# Patient Record
Sex: FEMALE | Race: WHITE | Employment: FULL TIME | URBAN - METROPOLITAN AREA
[De-identification: names, ages, dates, MRNs, and addresses within clinical notes are randomized per-mention and may not be internally consistent; named-entity substitution may affect disease eponyms.]

---

## 2017-01-23 ENCOUNTER — TELEPHONE (OUTPATIENT)
Dept: PEDIATRICS | Facility: CLINIC | Age: 47
End: 2017-01-23

## 2017-02-20 DIAGNOSIS — H81.01: ICD-10-CM

## 2017-02-20 NOTE — TELEPHONE ENCOUNTER
triamterene-hydrochlorothiazide (MAXZIDE-25) 37.5-25 MG per tablet  Last Written Prescription Date: 3/15/16  Last Fill Quantity: 90, # refills: 3  Last Office Visit with G, P or [facility] prescribing provider: 6/9/16       Potassium   Date Value Ref Range Status   05/19/2016 3.6 3.4 - 5.3 mmol/L Final     Creatinine   Date Value Ref Range Status   05/19/2016 1.05 (H) 0.52 - 1.04 mg/dL Final     Creatinine For Methaneph 24 H/Random Urine   Date Value Ref Range Status   06/27/2012 57  Final     Comment:     Unit: mg/dL     BP Readings from Last 3 Encounters:   06/09/16 112/66   05/19/16 130/82   11/03/15 122/70

## 2017-02-22 RX ORDER — TRIAMTERENE/HYDROCHLOROTHIAZID 37.5-25 MG
1 TABLET ORAL DAILY
Qty: 90 TABLET | Refills: 0 | Status: SHIPPED | OUTPATIENT
Start: 2017-02-22 | End: 2017-05-31

## 2017-02-22 NOTE — TELEPHONE ENCOUNTER
Routing refill request to provider for review/approval because:  Labs out of range:  Cr elevated and due for labs.    Do you want lab only appt or to see you also?

## 2017-04-19 DIAGNOSIS — F51.01 PRIMARY INSOMNIA: ICD-10-CM

## 2017-04-19 RX ORDER — ZOLPIDEM TARTRATE 10 MG/1
TABLET ORAL
Qty: 30 TABLET | Refills: 5 | Status: SHIPPED | OUTPATIENT
Start: 2017-04-19 | End: 2017-10-03

## 2017-04-19 NOTE — TELEPHONE ENCOUNTER
Due for visit for physical in may.  Script printed and placed in my outbasket.  Trisha Guerra MD

## 2017-04-19 NOTE — TELEPHONE ENCOUNTER
Routing refill request to provider for review/approval because:  Drug not on the [facility] refill protocol   Velvet Galvan, RN  Triage Nurse

## 2017-04-19 NOTE — TELEPHONE ENCOUNTER
ZOLPIDEM 10MG      Last Written Prescription Date:  10/21/2016  Last Fill Quantity: 30,   # refills: 5  [place] [facility] [place] [facility] or  Health prescribing provider: 6/9/2016  Future Office visit:       Routing refill request to provider for review/approval because:  Drug not on the [facility] or  Health refill protocol or controlled substance

## 2017-04-26 ENCOUNTER — E-VISIT (OUTPATIENT)
Dept: PEDIATRICS | Facility: CLINIC | Age: 47
End: 2017-04-26

## 2017-04-26 DIAGNOSIS — R21 RASH: Primary | ICD-10-CM

## 2017-04-27 NOTE — PROGRESS NOTES
"  SUBJECTIVE:                                                    Lorena Sears is a 47 year old female who presents to clinic today for the following health issues:      White spots      Duration: while traveling this winter    Description  Location: arms and throat, white spots, possible fungal infection? Ongoing cold sx on and off  Itching: no    Intensity:  mild    Accompanying signs and symptoms: None    History (similar episodes/previous evaluation): None    Precipitating or alleviating factors:  New exposures:  none  Recent travel: YES- [place]     Therapies tried and outcome: none     Was in the [facility] ([facility] and [place]  she noticed white spots/peeling on skin.  She states initially this looked like she had poorly applied sunscreen and had areas that were very white. They have since faded and \"now I can barely see them\".  Rash was not itchy.      Also had white patches and red patches on the back of her throat, tongue was sore.  She thinks this was thrush.  She is unsure if she had a fever associated with this.  She states symptoms resolved in 7-10 days, and now she doesn't notice any white patches in her mouth and her throat is not sore.     Since early feburary has felt run down.  Does have intermittent cough.  She is worried about the big picture- is there something that explains why she has gotten sick.        Problem list and histories reviewed & adjusted, as indicated.  Additional history: as documented    Patient Active Problem List   Diagnosis     CARDIOVASCULAR SCREENING; LDL GOAL LESS THAN 160     Moderate recurrent major depression (H)     PVC (premature ventricular contraction)     DDD (degenerative disc disease), lumbar     Benign hypertension     Neck Pain     Menopause     Episodic tension-type headache, not intractable     Active cochlear Meniere's disease, right     Past Surgical History:   Procedure Laterality Date     WRIST SURGERY      cyst removal on right wrist     " "  Social History   Substance Use Topics     Smoking status: Former Smoker     Types: Cigarettes     Quit date: 8/1/2011     Smokeless tobacco: Never Used     Alcohol use Yes      Comment: 1 drink a day     Family History   Problem Relation Age of Onset     Hypertension Mother      Thyroid Disease Mother      Hypertension Father      CANCER Father      non Hodgkin lymphoma./waldenstroms     Breast Cancer Maternal Grandmother      older at diagnosis     Alzheimer Disease Maternal Grandfather      Neurologic Disorder Maternal Grandfather      parkinsons     CANCER Maternal Grandfather      Alzheimer Disease Paternal Grandmother      CANCER Paternal Grandfather      Breast Cancer Maternal Aunt 50           Reviewed and updated as needed this visit by clinical staff  Tobacco  Allergies  Meds  Med Hx       Reviewed and updated as needed this visit by Provider         ROS:  Constitutional, HEENT, cardiovascular, pulmonary, gi and gu systems are negative, except as otherwise noted.    OBJECTIVE:                                                    /86 (BP Location: Right arm, Patient Position: Chair, Cuff Size: Adult Large)  Pulse 85  Temp 98.5  F (36.9  C) (Tympanic)  Ht 5' 6.25\" (1.683 m)  Wt 191 lb 2 oz (86.7 kg)  SpO2 98%  BMI 30.62 kg/m2  Body mass index is 30.62 kg/(m^2).  GENERAL: healthy, alert and no distress  EYES: Eyes grossly normal to inspection, PERRL and conjunctivae and sclerae normal  HENT: ear canals and TM's normal, nose and mouth without ulcers or lesions  NECK: no adenopathy, no asymmetry, masses, or scars and thyroid normal to palpation  RESP: lungs clear to auscultation - no rales, rhonchi or wheezes  CV: regular rate and rhythm, normal S1 S2, no S3 or S4, no murmur, click or rub, no peripheral edema and peripheral pulses strong  SKIN: very faint hypopigmented patches on left arm with some \"dry skin\" type scaling.      Diagnostic Test Results:  none      ASSESSMENT/PLAN:                  "                                   (J06.9,  B97.89) Viral URI  (primary encounter diagnosis)  -history sounds most consistent with strep or viral uri  -considered zika- rash is atypical, no conjunctivitis or joint pain, no documented fever (although could have had one)  -pt is a lesbian, not planning on having children or getting pregnant.     (R21) Rash  -possible this is tenia versicolor but seems to be improving  -asked patient to watch rash, will not treat at this time    (I10) Benign hypertension  -due for fasting labs prior to physical.  Plan: Albumin Random Urine Quantitative,         Comprehensive metabolic panel         (Z13.6) CARDIOVASCULAR SCREENING; LDL GOAL LESS THAN 160  Plan: Lipid Profile with reflex to direct LDL,         Comprehensive metabolic panel       FUTURE APPOINTMENTS:       - Follow-up for annual visit or as needed-scheduled    Trisha Guerra MD  [facility]

## 2017-05-02 ENCOUNTER — OFFICE VISIT (OUTPATIENT)
Dept: PEDIATRICS | Facility: CLINIC | Age: 47
End: 2017-05-02
Payer: COMMERCIAL

## 2017-05-02 VITALS
BODY MASS INDEX: 30.72 KG/M2 | DIASTOLIC BLOOD PRESSURE: 86 MMHG | OXYGEN SATURATION: 98 % | HEART RATE: 85 BPM | WEIGHT: 191.13 LBS | TEMPERATURE: 98.5 F | HEIGHT: 66 IN | SYSTOLIC BLOOD PRESSURE: 128 MMHG

## 2017-05-02 DIAGNOSIS — Z13.6 CARDIOVASCULAR SCREENING; LDL GOAL LESS THAN 160: ICD-10-CM

## 2017-05-02 DIAGNOSIS — J06.9 VIRAL URI: Primary | ICD-10-CM

## 2017-05-02 DIAGNOSIS — I10 BENIGN HYPERTENSION: ICD-10-CM

## 2017-05-02 DIAGNOSIS — R21 RASH: ICD-10-CM

## 2017-05-02 PROCEDURE — 99214 OFFICE O/P EST MOD 30 MIN: CPT | Performed by: INTERNAL MEDICINE

## 2017-05-02 RX ORDER — TRIAMTERENE/HYDROCHLOROTHIAZID 37.5-25 MG
1 TABLET ORAL DAILY
Qty: 90 TABLET | Refills: 0 | Status: CANCELLED | OUTPATIENT
Start: 2017-05-02

## 2017-05-02 RX ORDER — LOSARTAN POTASSIUM 50 MG/1
50 TABLET ORAL DAILY
Qty: 90 TABLET | Refills: 3 | Status: CANCELLED | OUTPATIENT
Start: 2017-05-02

## 2017-05-02 RX ORDER — CYCLOBENZAPRINE HCL 10 MG
10 TABLET ORAL
Qty: 90 TABLET | Refills: 3 | Status: CANCELLED | OUTPATIENT
Start: 2017-05-02

## 2017-05-02 RX ORDER — LORATADINE 10 MG/1
10 TABLET ORAL DAILY
Qty: 90 TABLET | Refills: 3 | Status: CANCELLED | OUTPATIENT
Start: 2017-05-02

## 2017-05-02 RX ORDER — NORETHINDRONE ACETATE AND ETHINYL ESTRADIOL 1; 5 MG/1; UG/1
1 TABLET ORAL DAILY
Qty: 90 TABLET | Refills: 3 | Status: CANCELLED | OUTPATIENT
Start: 2017-05-02

## 2017-05-02 ASSESSMENT — ANXIETY QUESTIONNAIRES
6. BECOMING EASILY ANNOYED OR IRRITABLE: SEVERAL DAYS
3. WORRYING TOO MUCH ABOUT DIFFERENT THINGS: MORE THAN HALF THE DAYS
1. FEELING NERVOUS, ANXIOUS, OR ON EDGE: SEVERAL DAYS
IF YOU CHECKED OFF ANY PROBLEMS ON THIS QUESTIONNAIRE, HOW DIFFICULT HAVE THESE PROBLEMS MADE IT FOR YOU TO DO YOUR WORK, TAKE CARE OF THINGS AT HOME, OR GET ALONG WITH OTHER PEOPLE: SOMEWHAT DIFFICULT
5. BEING SO RESTLESS THAT IT IS HARD TO SIT STILL: NOT AT ALL
GAD7 TOTAL SCORE: 6
2. NOT BEING ABLE TO STOP OR CONTROL WORRYING: SEVERAL DAYS
7. FEELING AFRAID AS IF SOMETHING AWFUL MIGHT HAPPEN: NOT AT ALL

## 2017-05-02 ASSESSMENT — PATIENT HEALTH QUESTIONNAIRE - PHQ9: 5. POOR APPETITE OR OVEREATING: SEVERAL DAYS

## 2017-05-02 NOTE — NURSING NOTE
"Chief Complaint   Patient presents with     Derm Problem     white patches on back of throat and arms       Initial /86 (BP Location: Right arm, Patient Position: Chair, Cuff Size: Adult Large)  Pulse 85  Temp 98.5  F (36.9  C) (Tympanic)  Ht 5' 6.25\" (1.683 m)  Wt 191 lb 2 oz (86.7 kg)  SpO2 98%  BMI 30.62 kg/m2 Estimated body mass index is 30.62 kg/(m^2) as calculated from the following:    Height as of this encounter: 5' 6.25\" (1.683 m).    Weight as of this encounter: 191 lb 2 oz (86.7 kg).  Medication Reconciliation: complete   Keisha Paiz CMA    "

## 2017-05-02 NOTE — MR AVS SNAPSHOT
After Visit Summary   5/2/2017    Lorena Sears    MRN: [id number]           Patient Information     Date Of Birth          1970        Visit Information        Provider Department      5/2/2017 11:20 AM Trisha Guerra MD [facility]        Today's Diagnoses     Benign hypertension    -  1    CARDIOVASCULAR SCREENING; LDL GOAL LESS THAN 160           Follow-ups after your visit        Your next 10 appointments already scheduled     May 24, 2017 10:00 AM CDT   LAB with [facility]   [facility] ([facility]    [address]  [address]  [place] [id number] [phone]           Patient must bring picture ID.  Patient should be prepared to give a urine specimen  Please do not eat 10-12 hours before your appointment if you are coming in fasting for labs on lipids, cholesterol, or glucose (sugar).  Pregnant women should follow their Care Team instructions. Water with medications is okay. Do not drink coffee or other fluids.   If you have concerns about taking  your medications, please ask at office or if scheduling via [employer], send a message by clicking on Secure Messaging, Message Your Care Team.            May 31, 2017 11:00 AM CDT   Office Visit with Trisha Guerra MD   [facility] ([facility]    [address]  [address]  [place] [address] [phone]           Bring a current list of meds and any records pertaining to this visit.  For Physicals, please bring immunization records and any forms needing to be filled out.  Please arrive 10 minutes early to complete paperwork.              Future tests that were ordered for you today     Open Future Orders        Priority Expected Expires Ordered    Lipid Profile with reflex to direct LDL Routine  8/2/2017 5/2/2017    Comprehensive metabolic panel Routine  8/2/2017 5/2/2017    Albumin Random Urine Quantitative Routine  8/2/2017 5/2/2017  "           Who to contact     If you have questions or need follow up information about today's clinic visit or your schedule please contact [facility] directly at [phone].  Normal or non-critical lab and imaging results will be communicated to you by MyChart, letter or phone within 4 business days after the clinic has received the results. If you do not hear from us within 7 days, please contact the clinic through [employer] or phone. If you have a critical or abnormal lab result, we will notify you by phone as soon as possible.  Submit refill requests through [employer] or call your pharmacy and they will forward the refill request to us. Please allow 3 business days for your refill to be completed.          Additional Information About Your Visit        Visible Path Information     [employer] gives you secure access to your electronic health record. If you see a primary care provider, you can also send messages to your care team and make appointments. If you have questions, please call your primary care clinic.  If you do not have a primary care provider, please call [phone] and they will assist you.        Care EveryWhere ID     This is your Care EveryWhere ID. This could be used by other organizations to access your [place] medical records  [id number]        Your Vitals Were     Pulse Temperature Height Pulse Oximetry BMI (Body Mass Index)       85 98.5  F (36.9  C) (Tympanic) 5' 6.25\" (1.683 m) 98% 30.62 kg/m2        Blood Pressure from Last 3 Encounters:   04/27/17 128/86   06/09/16 112/66   05/19/16 130/82    Weight from Last 3 Encounters:   04/27/17 191 lb 2 oz (86.7 kg)   06/09/16 187 lb 8 oz (85 kg)   05/19/16 188 lb 1 oz (85.3 kg)              We Performed the Following     DEPRESSION ACTION PLAN (DAP)        Primary Care Provider Office Phone # Fax #    Trisha Guerra [phone]       [address] [place] [address]        Thank " you!     Thank you for choosing [facility] [place]  for your care. Our goal is always to provide you with excellent care. Hearing back from our patients is one way we can continue to improve our services. Please take a few minutes to complete the written survey that you may receive in the mail after your visit with us. Thank you!             Your Updated Medication List - Protect others around you: Learn how to safely use, store and throw away your medicines at www.disposemymeds.org.          This list is accurate as of: 5/2/17 11:58 AM.  Always use your most recent med list.                   Brand Name Dispense Instructions for use    buPROPion 150 MG 24 hr tablet    WELLBUTRIN XL    90 tablet    Take 1 tablet (150 mg) by mouth every morning       cyclobenzaprine 10 MG tablet    FLEXERIL    90 tablet    Take 1 tablet (10 mg) by mouth nightly as needed for muscle spasms       ibuprofen 200 MG tablet    ADVIL/MOTRIN     Take 200 mg by mouth every 4 hours as needed.       loratadine 10 MG tablet    CLARITIN    90 tablet    Take 1 tablet (10 mg) by mouth daily       losartan 50 MG tablet    COZAAR    90 tablet    Take 1 tablet (50 mg) by mouth daily       norethindrone-ethinyl estradiol 1-5 MG-MCG per tablet    FEMHRT 1/5    90 tablet    Take 1 tablet by mouth daily       triamterene-hydrochlorothiazide 37.5-25 MG per tablet    MAXZIDE-25    90 tablet    Take 1 tablet by mouth daily       zolpidem 10 MG tablet    AMBIEN    30 tablet    Take 1 tablet (10 mg) by mouth at bedtime as needed for sleep.

## 2017-05-03 ASSESSMENT — ANXIETY QUESTIONNAIRES: GAD7 TOTAL SCORE: 6

## 2017-05-03 ASSESSMENT — PATIENT HEALTH QUESTIONNAIRE - PHQ9: SUM OF ALL RESPONSES TO PHQ QUESTIONS 1-9: 10

## 2017-05-24 DIAGNOSIS — I10 BENIGN HYPERTENSION: ICD-10-CM

## 2017-05-24 DIAGNOSIS — Z13.6 CARDIOVASCULAR SCREENING; LDL GOAL LESS THAN 160: ICD-10-CM

## 2017-05-24 PROCEDURE — 36415 COLL VENOUS BLD VENIPUNCTURE: CPT | Performed by: INTERNAL MEDICINE

## 2017-05-24 PROCEDURE — 82043 UR ALBUMIN QUANTITATIVE: CPT | Performed by: INTERNAL MEDICINE

## 2017-05-24 PROCEDURE — 80061 LIPID PANEL: CPT | Performed by: INTERNAL MEDICINE

## 2017-05-24 PROCEDURE — 80053 COMPREHEN METABOLIC PANEL: CPT | Performed by: INTERNAL MEDICINE

## 2017-05-25 LAB
ALBUMIN SERPL-MCNC: 4 G/DL (ref 3.4–5)
ALP SERPL-CCNC: 61 U/L (ref 40–150)
ALT SERPL W P-5'-P-CCNC: 59 U/L (ref 0–50)
ANION GAP SERPL CALCULATED.3IONS-SCNC: 12 MMOL/L (ref 3–14)
AST SERPL W P-5'-P-CCNC: 34 U/L (ref 0–45)
BILIRUB SERPL-MCNC: 0.5 MG/DL (ref 0.2–1.3)
BUN SERPL-MCNC: 18 MG/DL (ref 7–30)
CALCIUM SERPL-MCNC: 9 MG/DL (ref 8.5–10.1)
CHLORIDE SERPL-SCNC: 106 MMOL/L (ref 94–109)
CHOLEST SERPL-MCNC: 196 MG/DL
CO2 SERPL-SCNC: 22 MMOL/L (ref 20–32)
CREAT SERPL-MCNC: 1.14 MG/DL (ref 0.52–1.04)
CREAT UR-MCNC: 126 MG/DL
GFR SERPL CREATININE-BSD FRML MDRD: 51 ML/MIN/1.7M2
GLUCOSE SERPL-MCNC: 89 MG/DL (ref 70–99)
HDLC SERPL-MCNC: 58 MG/DL
LDLC SERPL CALC-MCNC: 117 MG/DL
MICROALBUMIN UR-MCNC: <5 MG/L
MICROALBUMIN/CREAT UR: NORMAL MG/G CR (ref 0–25)
NONHDLC SERPL-MCNC: 138 MG/DL
POTASSIUM SERPL-SCNC: 3.9 MMOL/L (ref 3.4–5.3)
PROT SERPL-MCNC: 7.7 G/DL (ref 6.8–8.8)
SODIUM SERPL-SCNC: 140 MMOL/L (ref 133–144)
TRIGL SERPL-MCNC: 106 MG/DL

## 2017-05-31 ENCOUNTER — OFFICE VISIT (OUTPATIENT)
Dept: PEDIATRICS | Facility: CLINIC | Age: 47
End: 2017-05-31
Payer: COMMERCIAL

## 2017-05-31 VITALS
HEIGHT: 66 IN | HEART RATE: 85 BPM | DIASTOLIC BLOOD PRESSURE: 82 MMHG | SYSTOLIC BLOOD PRESSURE: 118 MMHG | TEMPERATURE: 98.3 F | OXYGEN SATURATION: 98 % | BODY MASS INDEX: 30.7 KG/M2 | WEIGHT: 191 LBS

## 2017-05-31 DIAGNOSIS — N18.30 CKD (CHRONIC KIDNEY DISEASE) STAGE 3, GFR 30-59 ML/MIN (H): ICD-10-CM

## 2017-05-31 DIAGNOSIS — R10.84 ABDOMINAL PAIN, GENERALIZED: ICD-10-CM

## 2017-05-31 DIAGNOSIS — M25.512 ACUTE PAIN OF LEFT SHOULDER: ICD-10-CM

## 2017-05-31 DIAGNOSIS — H81.01: ICD-10-CM

## 2017-05-31 DIAGNOSIS — Z12.31 VISIT FOR SCREENING MAMMOGRAM: ICD-10-CM

## 2017-05-31 DIAGNOSIS — G47.00 INSOMNIA, UNSPECIFIED TYPE: ICD-10-CM

## 2017-05-31 DIAGNOSIS — M51.369 DDD (DEGENERATIVE DISC DISEASE), LUMBAR: ICD-10-CM

## 2017-05-31 DIAGNOSIS — I10 BENIGN HYPERTENSION: ICD-10-CM

## 2017-05-31 DIAGNOSIS — R25.2 CRAMP IN LOWER LEG: ICD-10-CM

## 2017-05-31 DIAGNOSIS — Z79.890 POST-MENOPAUSE ON HRT (HORMONE REPLACEMENT THERAPY): ICD-10-CM

## 2017-05-31 DIAGNOSIS — E66.9 NON MORBID OBESITY, UNSPECIFIED OBESITY TYPE: ICD-10-CM

## 2017-05-31 DIAGNOSIS — Z00.00 ROUTINE GENERAL MEDICAL EXAMINATION AT A HEALTH CARE FACILITY: Primary | ICD-10-CM

## 2017-05-31 PROCEDURE — 99213 OFFICE O/P EST LOW 20 MIN: CPT | Mod: 25 | Performed by: INTERNAL MEDICINE

## 2017-05-31 PROCEDURE — 99396 PREV VISIT EST AGE 40-64: CPT | Performed by: INTERNAL MEDICINE

## 2017-05-31 RX ORDER — CYCLOBENZAPRINE HCL 10 MG
10 TABLET ORAL
Qty: 90 TABLET | Refills: 3 | Status: SHIPPED | OUTPATIENT
Start: 2017-05-31 | End: 2018-07-26

## 2017-05-31 RX ORDER — LOSARTAN POTASSIUM 50 MG/1
50 TABLET ORAL DAILY
Qty: 90 TABLET | Refills: 3 | Status: SHIPPED | OUTPATIENT
Start: 2017-05-31 | End: 2018-06-14

## 2017-05-31 RX ORDER — NORETHINDRONE ACETATE AND ETHINYL ESTRADIOL .5; 2.5 MG/1; UG/1
1 TABLET ORAL DAILY
Qty: 30 TABLET | Refills: 1 | Status: SHIPPED | OUTPATIENT
Start: 2017-05-31 | End: 2018-04-03

## 2017-05-31 RX ORDER — TRIAMTERENE/HYDROCHLOROTHIAZID 37.5-25 MG
1 TABLET ORAL DAILY
Qty: 90 TABLET | Refills: 0 | Status: SHIPPED | OUTPATIENT
Start: 2017-05-31 | End: 2018-04-03

## 2017-05-31 ASSESSMENT — ANXIETY QUESTIONNAIRES
5. BEING SO RESTLESS THAT IT IS HARD TO SIT STILL: SEVERAL DAYS
1. FEELING NERVOUS, ANXIOUS, OR ON EDGE: NOT AT ALL
6. BECOMING EASILY ANNOYED OR IRRITABLE: MORE THAN HALF THE DAYS
3. WORRYING TOO MUCH ABOUT DIFFERENT THINGS: SEVERAL DAYS
7. FEELING AFRAID AS IF SOMETHING AWFUL MIGHT HAPPEN: NOT AT ALL
IF YOU CHECKED OFF ANY PROBLEMS ON THIS QUESTIONNAIRE, HOW DIFFICULT HAVE THESE PROBLEMS MADE IT FOR YOU TO DO YOUR WORK, TAKE CARE OF THINGS AT HOME, OR GET ALONG WITH OTHER PEOPLE: SOMEWHAT DIFFICULT
GAD7 TOTAL SCORE: 6
2. NOT BEING ABLE TO STOP OR CONTROL WORRYING: SEVERAL DAYS

## 2017-05-31 ASSESSMENT — PATIENT HEALTH QUESTIONNAIRE - PHQ9: 5. POOR APPETITE OR OVEREATING: SEVERAL DAYS

## 2017-05-31 NOTE — PROGRESS NOTES
SUBJECTIVE:     CC: Lorena Sears is an 47 year old woman who presents for preventive health visit.     Physical   Annual:     Getting at least 3 servings of Calcium per day::  Yes    Bi-annual eye exam::  Yes    Dental care twice a year::  Yes    Sleep apnea or symptoms of sleep apnea::  Daytime drowsiness    Diet::  Regular (no restrictions)    Frequency of exercise::  2-3 days/week    Duration of exercise::  30-45 minutes    Taking medications regularly::  Yes

## 2017-05-31 NOTE — NURSING NOTE
"Chief Complaint   Patient presents with     Physical       Initial /82 (BP Location: Right arm, Patient Position: Chair, Cuff Size: Adult Large)  Pulse 85  Temp 98.3  F (36.8  C) (Tympanic)  Ht 5' 6.25\" (1.683 m)  Wt 191 lb (86.6 kg)  SpO2 98%  BMI 30.6 kg/m2 Estimated body mass index is 30.6 kg/(m^2) as calculated from the following:    Height as of this encounter: 5' 6.25\" (1.683 m).    Weight as of this encounter: 191 lb (86.6 kg).  Medication Reconciliation: complete   Keisha Paiz CMA    "

## 2017-05-31 NOTE — MR AVS SNAPSHOT
After Visit Summary   5/31/2017    Lorena Sears    MRN: [id number]           Patient Information     Date Of Birth          1970        Visit Information        Provider Department      5/31/2017 11:00 AM Trisah Guerra MD [facility]        Today's Diagnoses     Routine general medical examination at a health care facility    -  1    Benign hypertension        CKD (chronic kidney disease) stage 3, GFR 30-59 ml/min        Non morbid obesity, unspecified obesity type        Acute pain of left shoulder        Post-menopause on HRT (hormone replacement therapy)        Active cochlear Meniere's disease, right        DDD (degenerative disc disease), lumbar        Insomnia, unspecified type        Visit for screening mammogram          Care Instructions      Preventive Health Recommendations  Female Ages 40 to 49    Yearly exam:     See your health care provider every year in order to  1. Review health changes.   2. Discuss preventive care.    3. Review your medicines if your doctor prescribed any.      Get a Pap test every three years (unless you have an abnormal result and your provider advises testing more often).      If you get Pap tests with HPV test, you only need to test every 5 years, unless you have an abnormal result. You do not need a Pap test if your uterus was removed (hysterectomy) and you have not had cancer.      You should be tested each year for STDs (sexually transmitted diseases), if you're at risk.       Ask your doctor if you should have a mammogram.      Have a colonoscopy (test for colon cancer) if someone in your family has had colon cancer or polyps before age 50.       Have a cholesterol test every 5 years.       Have a diabetes test (fasting glucose) after age 45. If you are at risk for diabetes, you should have this test every 3 years.    Shots: Get a flu shot each year. Get a tetanus shot every 10 years.     Nutrition:     Eat at least 5 servings of  fruits and vegetables each day.    Eat whole-grain bread, whole-wheat pasta and brown rice instead of white grains and rice.    Talk to your provider about Calcium and Vitamin D.     Lifestyle    Exercise at least 150 minutes a week (an average of 30 minutes a day, 5 days a week). This will help you control your weight and prevent disease.    Limit alcohol to one drink per day.    No smoking.     Wear sunscreen to prevent skin cancer.    See your dentist every six months for an exam and cleaning.          Follow-ups after your visit        Additional Services     ENDOCRINOLOGY ADULT REFERRAL       Your provider has referred you to: [facility]: [facility] [place] [facility] [place] [phone]   [email or web address]      Please be aware that coverage of these services is subject to the terms and limitations of your health insurance plan.  Call member services at your health plan with any benefit or coverage questions.      Please bring the following to your appointment:    >>   Any x-rays, CTs or MRIs which have been performed.  Contact the facility where they were done to arrange for  prior to your scheduled appointment.    >>   List of current medications   >>   This referral request   >>   Any documents/labs given to you for this referral            [facility] PT, HAND, AND CHIROPRACTIC REFERRAL       **This order will print in the [facility] Scheduling Office**    Physical Therapy, Hand Therapy and Chiropractic Care are available through:    *[facility]  *[facility]  *[facility]    Call one number to schedule at any of the above locations: [phone].    Your provider has referred you to: Physical Therapy at [facility] or [facility]    Indication/Reason for Referral: shoulder pain  Onset of Illness: 5-6 months   Therapy Orders: Evaluate and Treat  Special Programs: None  Special Request: None    Margaret Robles      Additional Comments for the Therapist or  Chiropractor:     Please be aware that coverage of these services is subject to the terms and limitations of your health insurance plan.  Call member services at your health plan with any benefit or coverage questions.      Please bring the following to your appointment:    *Your personal calendar for scheduling future appointments  *Comfortable clothing                  Future tests that were ordered for you today     Open Future Orders        Priority Expected Expires Ordered    Basic metabolic panel Routine  7/31/2017 5/31/2017    *MA Screening Digital Bilateral Routine  5/24/2018 5/31/2017            Who to contact     If you have questions or need follow up information about today's clinic visit or your schedule please contact [facility] [place] directly at [phone].  Normal or non-critical lab and imaging results will be communicated to you by MyChart, letter or phone within 4 business days after the clinic has received the results. If you do not hear from us within 7 days, please contact the clinic through [employer] or phone. If you have a critical or abnormal lab result, we will notify you by phone as soon as possible.  Submit refill requests through [employer] or call your pharmacy and they will forward the refill request to us. Please allow 3 business days for your refill to be completed.          Additional Information About Your Visit        MyChart Information     [employer] gives you secure access to your electronic health record. If you see a primary care provider, you can also send messages to your care team and make appointments. If you have questions, please call your primary care clinic.  If you do not have a primary care provider, please call [phone] and they will assist you.        Care EveryWhere ID     This is your Care EveryWhere ID. This could be used by other organizations to access your [place] medical records  [id number]        Your Vitals Were     Pulse Temperature Height Pulse  "Oximetry BMI (Body Mass Index)       85 98.3  F (36.8  C) (Tympanic) 5' 6.25\" (1.683 m) 98% 30.6 kg/m2        Blood Pressure from Last 3 Encounters:   05/31/17 118/82   04/27/17 128/86   06/09/16 112/66    Weight from Last 3 Encounters:   05/31/17 191 lb (86.6 kg)   04/27/17 191 lb 2 oz (86.7 kg)   06/09/16 187 lb 8 oz (85 kg)              We Performed the Following     ENDOCRINOLOGY ADULT REFERRAL     [facility] PT, HAND, AND CHIROPRACTIC REFERRAL          Today's Medication Changes          These changes are accurate as of: 5/31/17 11:44 AM.  If you have any questions, ask your nurse or doctor.               These medicines have changed or have updated prescriptions.        Dose/Directions    * norethindrone-ethinyl estradiol 1-5 MG-MCG per tablet   Commonly known as:  FEMHRT 1/5   This may have changed:  Another medication with the same name was added. Make sure you understand how and when to take each.   Used for:  Menopause   Changed by:  Trisha Guerra MD        Dose:  1 tablet   Take 1 tablet by mouth daily   Quantity:  90 tablet   Refills:  3       * norethindrone-eth estradiol 0.5-2.5 MG-MCG Tabs   This may have changed:  You were already taking a medication with the same name, and this prescription was added. Make sure you understand how and when to take each.   Used for:  Post-menopause on HRT (hormone replacement therapy)   Changed by:  Trisha Guerra MD        Dose:  1 tablet   Take 1 tablet by mouth daily   Quantity:  30 tablet   Refills:  1       * Notice:  This list has 2 medication(s) that are the same as other medications prescribed for you. Read the directions carefully, and ask your doctor or other care provider to review them with you.         Where to get your medicines      These medications were sent to [facility] - [address] [address]     Phone:  [phone]     cyclobenzaprine " 10 MG tablet    losartan 50 MG tablet    norethindrone-eth estradiol 0.5-2.5 MG-MCG Tabs    triamterene-hydrochlorothiazide 37.5-25 MG per tablet                Primary Care Provider Office Phone # Fax #    Trisha Guerra [phone]       [facility] [address] [place] [address]        Thank you!     Thank you for choosing [facility]  for your care. Our goal is always to provide you with excellent care. Hearing back from our patients is one way we can continue to improve our services. Please take a few minutes to complete the written survey that you may receive in the mail after your visit with us. Thank you!             Your Updated Medication List - Protect others around you: Learn how to safely use, store and throw away your medicines at www.disposemymeds.org.          This list is accurate as of: 5/31/17 11:44 AM.  Always use your most recent med list.                   Brand Name Dispense Instructions for use    buPROPion 150 MG 24 hr tablet    WELLBUTRIN XL    90 tablet    Take 1 tablet (150 mg) by mouth every morning       cyclobenzaprine 10 MG tablet    FLEXERIL    90 tablet    Take 1 tablet (10 mg) by mouth nightly as needed for muscle spasms       ibuprofen 200 MG tablet    ADVIL/MOTRIN     Take 200 mg by mouth every 4 hours as needed.       loratadine 10 MG tablet    CLARITIN    90 tablet    Take 1 tablet (10 mg) by mouth daily       losartan 50 MG tablet    COZAAR    90 tablet    Take 1 tablet (50 mg) by mouth daily       * norethindrone-ethinyl estradiol 1-5 MG-MCG per tablet    FEMHRT 1/5    90 tablet    Take 1 tablet by mouth daily       * norethindrone-eth estradiol 0.5-2.5 MG-MCG Tabs     30 tablet    Take 1 tablet by mouth daily       triamterene-hydrochlorothiazide 37.5-25 MG per tablet    MAXZIDE-25    90 tablet    Take 1 tablet by mouth daily       zolpidem 10 MG tablet    AMBIEN    30 tablet    Take 1 tablet (10 mg) by mouth at  bedtime as needed for sleep.       * Notice:  This list has 2 medication(s) that are the same as other medications prescribed for you. Read the directions carefully, and ask your doctor or other care provider to review them with you.

## 2017-05-31 NOTE — PROGRESS NOTES
"   SUBJECTIVE:     CC: Lorena Sears is an 47 year old woman who presents for preventive health visit.     Physical   Annual:     Getting at least 3 servings of Calcium per day::  Yes    Bi-annual eye exam::  Yes    Dental care twice a year::  Yes    Sleep apnea or symptoms of sleep apnea::  Daytime drowsiness    Diet::  Regular (no restrictions)    Frequency of exercise::  2-3 days/week    Duration of exercise::  30-45 minutes    Taking medications regularly::  Yes    Medication side effects::  Other (leg cramps)    Additional concerns today::  YES (discuss left shoulder pain, leg cramps)    1) Lower legs cramps \"constantly\", she states at least once a day.  Majority are in the evening or night but can be any time.  Is able to walk through them.  Usually no swelling in feet or ankles.    2) renal function:  Taking 800mg of ibuprofen 5x weekly.    3) weight:  Would like to see endo to discuss work up for obesity.    4) has been on estrogen about 4 years.  Wondering about trying to come off medication.    5) will randomly get deep abdominal pain, usually within 5-10 min of starting exercise but now is occurring without exercise.  Wondering if it is back muscle pain.  Lasts about 10 min, will make her lay on floor and then will resolve on its own.  occurrs about every other month.   6) pain in left shoulder with raising it to shoulder level for the past month.     Hypertension Follow-up      Outpatient blood pressures are not being checked.    Low Salt Diet: not monitoring salt     Depression Followup    Status since last visit: Stable     See PHQ-9 for current symptoms.  Other associated symptoms: None    Complicating factors:   Significant life event:  No   Current substance abuse:  None  Anxiety or Panic symptoms:  No    PHQ-9  English PHQ-9   Any Language            Today's PHQ-2 Score:   PHQ-2 ( 1999 [employer]) 5/28/2017   Q1: Little interest or pleasure in doing things 1   Q2: Feeling down, depressed or hopeless 1 "   PHQ-2 Score 2   Q1: Little interest or pleasure in doing things Several days   Q2: Feeling down, depressed or hopeless Several days   PHQ-2 Score 2       Abuse: Current or Past(Physical, Sexual or Emotional)- NO  Do you feel safe in your environment - YES    Social History   Substance Use Topics     Smoking status: Former Smoker     Types: Cigarettes     Quit date: 8/1/2011     Smokeless tobacco: Never Used     Alcohol use Yes      Comment: 1 drink a day     The patient does not drink >3 drinks per day nor >7 drinks per week.    Recent Labs   Lab Test  05/24/17   0941  05/19/16   1015  04/26/11   0923   CHOL  196  186  173   HDL  58  61  48*   LDL  117*  108*  112   TRIG  106  87  62   CHOLHDLRATIO   --    --   3.6   NHDL  138*  125   --        Reviewed orders with patient.  Reviewed health maintenance and updated orders accordingly - Yes    Mammo Decision Support:  Patient under age 50, mutual decision reflected in health maintenance.      Pertinent mammograms are reviewed under the imaging tab.  History of abnormal Pap smear: NO - age 30-65 PAP every 5 years with negative HPV co-testing recommended    Reviewed and updated as needed this visit by clinical staff  Tobacco  Allergies  Meds  Med Hx  Surg Hx  Fam Hx  Soc Hx        Reviewed and updated as needed this visit by Provider        History reviewed. No pertinent past medical history.   Past Surgical History:   Procedure Laterality Date     WRIST SURGERY      cyst removal on right wrist       ROS:  C: NEGATIVE for fever, chills, change in weight  I: NEGATIVE for worrisome rashes, moles or lesions  E: NEGATIVE for vision changes or irritation  ENT: NEGATIVE for ear, mouth and throat problems  R: NEGATIVE for significant cough or SOB  B: NEGATIVE for masses, tenderness or discharge  CV: NEGATIVE for chest pain, palpitations or peripheral edema  GI: NEGATIVE for nausea, abdominal pain, heartburn, or change in bowel habits  : NEGATIVE for unusual urinary  "or vaginal symptoms. No vaginal bleeding.  M: NEGATIVE for significant arthralgias or myalgia  N: NEGATIVE for weakness, dizziness or paresthesias  P: NEGATIVE for changes in mood or affect     Problem list, Medication list, Allergies, and Medical/Social/Surgical histories reviewed in EPIC and updated as appropriate.  OBJECTIVE:     /82 (BP Location: Right arm, Patient Position: Chair, Cuff Size: Adult Large)  Pulse 85  Temp 98.3  F (36.8  C) (Tympanic)  Ht 5' 6.25\" (1.683 m)  Wt 191 lb (86.6 kg)  SpO2 98%  BMI 30.6 kg/m2  EXAM:  GENERAL: healthy, alert and no distress  EYES: Eyes grossly normal to inspection, PERRL and conjunctivae and sclerae normal  HENT: ear canals and TM's normal, nose and mouth without ulcers or lesions  NECK: no adenopathy, no asymmetry, masses, or scars and thyroid normal to palpation  RESP: lungs clear to auscultation - no rales, rhonchi or wheezes  BREAST: normal without masses, tenderness or nipple discharge and no palpable axillary masses or adenopathy  CV: regular rate and rhythm, normal S1 S2, no S3 or S4, no murmur, click or rub, no peripheral edema  ABDOMEN: soft, nontender, no hepatosplenomegaly, no masses and bowel sounds normal  MS: no gross musculoskeletal defects noted, no edema  SKIN: no suspicious lesions or rashes  NEURO: Normal strength and tone, mentation intact and speech normal  PSYCH: mentation appears normal, affect normal/bright    ASSESSMENT/PLAN:     (Z00.00) Routine general medical examination at a health care facility  (primary encounter diagnosis)  -mammo due  -pap utd  -imm utd      (R25.2) Cramp in lower leg  -could be due to hctz  -check electrolytes  -discussed stretching  -if labs normal would consider stopping hctz to see if that improves cramping     (Z79.890) Post-menopause on HRT (hormone replacement therapy)  -will cut dose in 1/2 and see if she has an increase in hot flashes  -if no increase in hot flashes can decrease her dose to QOD and " "then gradually wean off over a few months   Plan: norethindrone-eth estradiol 0.5-2.5 MG-MCG TABS        (E66.9) Non morbid obesity, unspecified obesity type  -pt wishes to discuss possible weight loss medications with endocrinology   Plan: ENDOCRINOLOGY ADULT REFERRAL            (R10.84) Abdominal pain, generalized  -this pain doesn't seem to fit with any particular clear diagnosis for abdominal pain  -association with exercise makes me wonder about core muscle cramping  -also would fit with an increase in her generalized muscle cramping     (I10) Benign hypertension  -bp well controlled on current meds of maxide and losartan  -if she stops maxide would need to increase losartan to 100mg   Plan: losartan (COZAAR) 50 MG tablet           (N18.3) CKD (chronic kidney disease) stage 3, GFR 30-59 ml/min  -ibuprofen use is reasonable, unless renal function is worsening  -check labs   Plan: Basic metabolic panel       (M25.512) Acute pain of left shoulder  -will refer to PT   Plan: [facility] PT, HAND, AND CHIROPRACTIC REFERRAL          (H81.01) Active cochlear Meniere's disease, right  Plan: triamterene-hydrochlorothiazide (MAXZIDE-25)         37.5-25 MG per tablet          (M51.36) DDD (degenerative disc disease), lumbar  Plan: cyclobenzaprine (FLEXERIL) 10 MG tablet         (G47.00) Insomnia, unspecified type  -on chronic ambien  -understands risks of ambien and wishes to stay on medication     (Z12.31) Visit for screening mammogram  Plan: *MA Screening Digital Bilateral         COUNSELING:  Reviewed preventive health counseling, as reflected in patient instructions       Regular exercise       Healthy diet/nutrition         reports that she quit smoking about 5 years ago. Her smoking use included Cigarettes. She has never used smokeless tobacco.    Estimated body mass index is 30.6 kg/(m^2) as calculated from the following:    Height as of this encounter: 5' 6.25\" (1.683 m).    Weight as of this encounter: 191 lb (86.6 kg). "   Weight management plan: Patient referred to endocrine and/or weight management specialty    Counseling Resources:  ATP IV Guidelines  Pooled Cohorts Equation Calculator  Breast Cancer Risk Calculator  FRAX Risk Assessment  ICSI Preventive Guidelines  Dietary Guidelines for Americans, 2010  USDA's MyPlate  ASA Prophylaxis  Lung CA Screening    Trisha Guerra MD  [facility]  Answers for HPI/ROS submitted by the patient on 5/28/2017   PHQ-2 Score: 2

## 2017-06-01 ASSESSMENT — ANXIETY QUESTIONNAIRES: GAD7 TOTAL SCORE: 6

## 2017-06-01 ASSESSMENT — PATIENT HEALTH QUESTIONNAIRE - PHQ9: SUM OF ALL RESPONSES TO PHQ QUESTIONS 1-9: 9

## 2017-06-02 ENCOUNTER — MYC MEDICAL ADVICE (OUTPATIENT)
Dept: PEDIATRICS | Facility: CLINIC | Age: 47
End: 2017-06-02

## 2017-06-02 NOTE — LETTER
[address] [address]  [phone]      2017    To whom it may concern,    Lorena Sears RANJAN 1970 is under my care for chronic headaches and neck and shoulder pain. I have recommended massage therapy to help with this pain, it does decrease her headaches.        Sincerely,         Trisha Guerra MD

## 2017-06-07 ENCOUNTER — AMBULATORY - HEALTHEAST (OUTPATIENT)
Dept: ADMINISTRATIVE | Facility: REHABILITATION | Age: 47
End: 2017-06-07

## 2017-06-07 DIAGNOSIS — M25.512 ACUTE PAIN OF LEFT SHOULDER: ICD-10-CM

## 2017-06-07 PROBLEM — E66.9 NON MORBID OBESITY, UNSPECIFIED OBESITY TYPE: Status: ACTIVE | Noted: 2017-06-07

## 2017-06-07 PROBLEM — E66.9 NON MORBID OBESITY, UNSPECIFIED OBESITY TYPE: Status: ACTIVE | Noted: 2017-05-31

## 2017-06-08 DIAGNOSIS — F33.1 MODERATE RECURRENT MAJOR DEPRESSION (H): ICD-10-CM

## 2017-06-08 NOTE — TELEPHONE ENCOUNTER
buPROPion (WELLBUTRIN XL) 150 MG 24 hr tablet       Last Written Prescription Date: 8/10/2016  Last Fill Quantity: 90; # refills: 3  Last Office Visit with [facility], [facility] or [facility] prescribing provider:  5/31/2017        Last PHQ-9 score on record=   PHQ-9 SCORE 5/31/2017   Total Score MyChart -   Total Score 9       Lab Results   Component Value Date    AST 34 05/24/2017     Lab Results   Component Value Date    ALT 59 05/24/2017

## 2017-06-14 RX ORDER — BUPROPION HYDROCHLORIDE 150 MG/1
TABLET ORAL
Qty: 90 TABLET | Refills: 3 | Status: SHIPPED | OUTPATIENT
Start: 2017-06-14 | End: 2018-06-14

## 2017-06-14 NOTE — TELEPHONE ENCOUNTER
Routing refill request to provider for review/approval because:  Patient was advised to come back for follow up.  PHQ 9 >4, please sign if ok.  Velvet Galvan, RN  Triage Nurse

## 2017-06-27 ENCOUNTER — COMMUNICATION - HEALTHEAST (OUTPATIENT)
Dept: TELEHEALTH | Facility: CLINIC | Age: 47
End: 2017-06-27

## 2017-06-27 ENCOUNTER — OFFICE VISIT - HEALTHEAST (OUTPATIENT)
Dept: PHYSICAL THERAPY | Facility: REHABILITATION | Age: 47
End: 2017-06-27

## 2017-06-27 DIAGNOSIS — R29.3 POOR POSTURE: ICD-10-CM

## 2017-06-27 DIAGNOSIS — M25.512 ACUTE PAIN OF LEFT SHOULDER: ICD-10-CM

## 2017-06-27 DIAGNOSIS — M25.612 DECREASED ROM OF LEFT SHOULDER: ICD-10-CM

## 2017-06-27 DIAGNOSIS — R29.898 SHOULDER WEAKNESS: ICD-10-CM

## 2017-06-27 DIAGNOSIS — M25.812 SHOULDER IMPINGEMENT, LEFT: ICD-10-CM

## 2017-06-29 ENCOUNTER — OFFICE VISIT - HEALTHEAST (OUTPATIENT)
Dept: PHYSICAL THERAPY | Facility: REHABILITATION | Age: 47
End: 2017-06-29

## 2017-06-29 DIAGNOSIS — M25.812 SHOULDER IMPINGEMENT, LEFT: ICD-10-CM

## 2017-06-29 DIAGNOSIS — R29.3 POOR POSTURE: ICD-10-CM

## 2017-06-29 DIAGNOSIS — M25.612 DECREASED ROM OF LEFT SHOULDER: ICD-10-CM

## 2017-06-29 DIAGNOSIS — R29.898 SHOULDER WEAKNESS: ICD-10-CM

## 2017-06-29 DIAGNOSIS — M25.512 ACUTE PAIN OF LEFT SHOULDER: ICD-10-CM

## 2017-07-11 ENCOUNTER — OFFICE VISIT - HEALTHEAST (OUTPATIENT)
Dept: PHYSICAL THERAPY | Facility: REHABILITATION | Age: 47
End: 2017-07-11

## 2017-07-11 DIAGNOSIS — M25.512 ACUTE PAIN OF LEFT SHOULDER: ICD-10-CM

## 2017-07-11 DIAGNOSIS — M25.812 SHOULDER IMPINGEMENT, LEFT: ICD-10-CM

## 2017-07-11 DIAGNOSIS — R29.898 SHOULDER WEAKNESS: ICD-10-CM

## 2017-07-11 DIAGNOSIS — M25.612 DECREASED ROM OF LEFT SHOULDER: ICD-10-CM

## 2017-07-11 DIAGNOSIS — R29.3 POOR POSTURE: ICD-10-CM

## 2017-07-17 ENCOUNTER — OFFICE VISIT (OUTPATIENT)
Dept: ENDOCRINOLOGY | Facility: CLINIC | Age: 47
End: 2017-07-17
Payer: COMMERCIAL

## 2017-07-17 VITALS
HEIGHT: 66 IN | BODY MASS INDEX: 31.19 KG/M2 | SYSTOLIC BLOOD PRESSURE: 122 MMHG | HEART RATE: 71 BPM | WEIGHT: 194.1 LBS | OXYGEN SATURATION: 98 % | TEMPERATURE: 98.1 F | DIASTOLIC BLOOD PRESSURE: 84 MMHG

## 2017-07-17 DIAGNOSIS — E66.9 NON MORBID OBESITY, UNSPECIFIED OBESITY TYPE: Primary | ICD-10-CM

## 2017-07-17 DIAGNOSIS — I10 BENIGN HYPERTENSION: ICD-10-CM

## 2017-07-17 LAB
HBA1C MFR BLD: 4.9 % (ref 4.3–6)
TSH SERPL DL<=0.005 MIU/L-ACNC: 2.66 MU/L (ref 0.4–4)

## 2017-07-17 PROCEDURE — 36415 COLL VENOUS BLD VENIPUNCTURE: CPT | Performed by: INTERNAL MEDICINE

## 2017-07-17 PROCEDURE — 99244 OFF/OP CNSLTJ NEW/EST MOD 40: CPT | Performed by: INTERNAL MEDICINE

## 2017-07-17 PROCEDURE — 84443 ASSAY THYROID STIM HORMONE: CPT | Performed by: INTERNAL MEDICINE

## 2017-07-17 PROCEDURE — 83036 HEMOGLOBIN GLYCOSYLATED A1C: CPT | Performed by: INTERNAL MEDICINE

## 2017-07-17 NOTE — MR AVS SNAPSHOT
After Visit Summary   2017    Lorena Sears    MRN: [id number]           Patient Information     Date Of Birth          1970        Visit Information        Provider Department      2017 10:00 AM Tiana Anderson MD [facility]        Today's Diagnoses     Non morbid obesity, unspecified obesity type    -  1    Benign hypertension          Care Instructions    [facility]   Dr Anderson, Endocrinology Department      [facility]   [address]  [place] [address]  Appointment [phone]  Fax: [phone]  [place]: Monday and Tuesday         [place] [address] [facility] [facility] [address]  [place] [address]  Appointment [phone]  Fax: [phone]  [place]: Wednesday and Thursday          Labs today  Follow up with dietician    24 Hour Urine Collection    - During a 24-hour urine collection, follow your usual diet and drink fluids as you ordinarily would.  - Avoid drinking alcohol before and during the urine collection.    - For a 24-hour urine collection, all of the urine that you pass over a 24-hour time period must be collected. You will receive a special container to collect the sample.    The following are directions for collecting a 24-hour urine sample while at home:    In the morning scheduled to begin the urine collection, urinate in the toilet and flush away the first urine you pass. Write down the date and time. That is the start date and time for the collection.    Collect all urine you pass, day and night, for 24 hours. Use the container given to you to collect the urine. Avoid using other containers. The urine sample must include the last urine that you pass 24 hours after starting the collection. Do not allow toilet paper, stool, or anything else to be added to the urine sample.    Write down the date and time that the last sample is  collected.              Follow-ups after your visit        Additional Services     NUTRITION REFERRAL       Your provider has referred you to: [facility]: [facility] [phone]   [email or web address]    Please be aware that coverage of these services is subject to the terms and limitations of your health insurance plan.  Call member services at your health plan with any benefit or coverage questions.      Please bring the following to your appointment:      >>   This referral request   >>   Any documents given to you for this referral  >>   Any specific questions you have about diet or food choices                  Future tests that were ordered for you today     Open Future Orders        Priority Expected Expires Ordered    Cortisol free urine Routine  7/18/2018 7/17/2017            Who to contact     If you have questions or need follow up information about today's clinic visit or your schedule please contact [facility] directly at [phone].  Normal or non-critical lab and imaging results will be communicated to you by MyChart, letter or phone within 4 business days after the clinic has received the results. If you do not hear from us within 7 days, please contact the clinic through [employer] or phone. If you have a critical or abnormal lab result, we will notify you by phone as soon as possible.  Submit refill requests through [employer] or call your pharmacy and they will forward the refill request to us. Please allow 3 business days for your refill to be completed.          Additional Information About Your Visit        MyChart Information     [employer] gives you secure access to your electronic health record. If you see a primary care provider, you can also send messages to your care team and make appointments. If you have questions, please call your primary care clinic.  If you do not have a primary care provider, please call [phone] and they will  "assist you.        Care EveryWhere ID     This is your Care EveryWhere ID. This could be used by other organizations to access your [place] medical records  [id number]        Your Vitals Were     Pulse Temperature Height Pulse Oximetry BMI (Body Mass Index)       71 98.1  F (36.7  C) (Oral) 1.683 m (5' 6.25\") 98% 31.09 kg/m2        Blood Pressure from Last 3 Encounters:   07/17/17 122/84   05/31/17 118/82   04/27/17 128/86    Weight from Last 3 Encounters:   07/17/17 88 kg (194 lb 1.6 oz)   05/31/17 86.6 kg (191 lb)   04/27/17 86.7 kg (191 lb 2 oz)              We Performed the Following     Hemoglobin A1c     NUTRITION REFERRAL     TSH with free T4 reflex     WEIGHT MANAGEMENT INFO        Primary Care Provider Office Phone # Fax #    Trisha Guerra [phone]       [address] [place] [address]        Equal Access to Services     [facility] : Hadii aad ku hadasho Soomaali, waaxda luqadaha, qaybta kaalmada adeegyada, waxay idiin hayandin kate littlejohn . So [facility] [phone].    ATENCIÓN: Si habla español, tiene a alfred disposición servicios gratuitos de asistencia lingüística. Llame al [phone].    We comply with applicable federal civil rights laws and [place] laws. We do not discriminate on the basis of race, color, national origin, age, disability sex, sexual orientation or gender identity.            Thank you!     Thank you for choosing [facility]  for your care. Our goal is always to provide you with excellent care. Hearing back from our patients is one way we can continue to improve our services. Please take a few minutes to complete the written survey that you may receive in the mail after your visit with us. Thank you!             Your Updated Medication List - Protect others around you: Learn how to safely use, store and throw away your medicines at www.disposemymeds.org.          This list is accurate as of: 7/17/17 " 10:45 AM.  Always use your most recent med list.                   Brand Name Dispense Instructions for use Diagnosis    * buPROPion 150 MG 24 hr tablet    WELLBUTRIN XL    90 tablet    Take 1 tablet (150 mg) by mouth every morning    Moderate recurrent major depression (H)       * buPROPion 150 MG 24 hr tablet    WELLBUTRIN XL    90 tablet    Take 1 tablet (150 mg) by mouth daily every morning.    Moderate recurrent major depression (H)       cyclobenzaprine 10 MG tablet    FLEXERIL    90 tablet    Take 1 tablet (10 mg) by mouth nightly as needed for muscle spasms    DDD (degenerative disc disease), lumbar       ibuprofen 200 MG tablet    ADVIL/MOTRIN     Take 200 mg by mouth every 4 hours as needed.        loratadine 10 MG tablet    CLARITIN    90 tablet    Take 1 tablet (10 mg) by mouth daily    Environmental allergies       losartan 50 MG tablet    COZAAR    90 tablet    Take 1 tablet (50 mg) by mouth daily    Benign hypertension       * norethindrone-eth estradiol 0.5-2.5 MG-MCG Tabs     30 tablet    Take 1 tablet by mouth daily    Post-menopause on HRT (hormone replacement therapy)       * norethindrone-ethinyl estradiol 1-5 MG-MCG per tablet    FEMHRT 1/5    90 tablet    Take 1 tablet by mouth daily    Menopause       triamterene-hydrochlorothiazide 37.5-25 MG per tablet    MAXZIDE-25    90 tablet    Take 1 tablet by mouth daily    Active cochlear Meniere's disease, right       zolpidem 10 MG tablet    AMBIEN    30 tablet    Take 1 tablet (10 mg) by mouth at bedtime as needed for sleep.    Primary insomnia       * Notice:  This list has 4 medication(s) that are the same as other medications prescribed for you. Read the directions carefully, and ask your doctor or other care provider to review them with you.

## 2017-07-17 NOTE — PROGRESS NOTES
Name: Lorena Sears  Seen at the request of Trisha Guerra for obesity.  HPI:  Lorena Sears is a 47 year old female who presents for the evaluation of obestiy.           Initial visit     Previous visit       Current   weight       194 lbs 1.6 oz     BMI   Body mass index is 31.09 kg/(m^2).     Waist circumference        Growing up was overweight.  20-30 lbs more than normal wt.  H/o early menopause.  No children.  Gradual wt gain over last few years  Likes to cook and eat.  When back from home- tired and not eating healthy.      Menses: early menopause at age 42.  Diarrhea/Constipation:Yes: more constipation  Changes in Hair or Skin:No. Maybe more dry skin  Diabetes:No  Sleep: use Ambien. 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  Hypertension:Yes: on medication  Hyperlipidemia:No  Hirsutism:No  Easy Brusing:Yes: thinks yes  Use of Steroids:No  Family history of Obesity:Yes: brother is obese. other family memerbs overwt  Diet: mostly home food. 2-3 meals a day. Snacks at work. More eating as a part of comfort measure.   Exercise:Yes: gym 3- 5 times a week. 30-50 min cross fit.  PMH/PSH:  History reviewed. No pertinent past medical history.  Past Surgical History:   Procedure Laterality Date     WRIST SURGERY      cyst removal on right wrist     Family Hx:  Family History   Problem Relation Age of Onset     Hypertension Mother      Thyroid Disease Mother      Hypertension Father      CANCER Father      non Hodgkin lymphoma./waldenstroms     Breast Cancer Maternal Grandmother      older at diagnosis     Alzheimer Disease Maternal Grandfather      Neurologic Disorder Maternal Grandfather      parkinsons     CANCER Maternal Grandfather      Alzheimer Disease Paternal Grandmother      CANCER Paternal Grandfather      Breast Cancer Maternal Aunt 50            Social Hx:  Social History     Social History     Marital status: Life Partner     Spouse name: N/A      "Number of children: N/A     Years of education: N/A     Occupational History     Not on file.     Social History Main Topics     Smoking status: Former Smoker     Types: Cigarettes     Quit date: 8/1/2011     Smokeless tobacco: Never Used     Alcohol use Yes      Comment: 1 drink a day     Drug use: No     Sexual activity: Yes     Partners: Female     Other Topics Concern     Parent/Sibling W/ Cabg, Mi Or Angioplasty Before 65f 55m? No     Social History Narrative          MEDICATIONS:  has a current medication list which includes the following prescription(s): norethindrone-ethinyl estradiol, bupropion, losartan, cyclobenzaprine, norethindrone-eth estradiol, zolpidem, bupropion, ibuprofen, triamterene-hydrochlorothiazide, and loratadine.    ROS     ROS: 10 point ROS neg other than the symptoms noted above in the HPI.    Physical Exam   VS: /84  Pulse 71  Temp 98.1  F (36.7  C) (Oral)  Ht 1.683 m (5' 6.25\")  Wt 88 kg (194 lb 1.6 oz)  SpO2 98%  BMI 31.09 kg/m2  GENERAL: AXOX3, NAD, well dressed, answering questions appropriately, appears stated age.  HEENT: OP clear, no LAD, no TM, non-tender, no exopthalmous, no proptosis, EOMI, no lig lag, no retraction  NECK: Thyroid normal in size, non tender, no nodules were palpated.  CV: RRR, no rubs, gallops, no murmurs  LUNGS: CTAB, no wheezes, rales, or ronchi  ABDOMEN: soft, nontender, nondistended, +BS, no organomegaly. No hyperpigmented stretch marks  EXTREMITIES: no edema, +pulses, no rashes, no lesions  NEUROLOGY: CN grossly intact, + DTR upper and lower extremity, no tremors  MSK: grossly intact  SKIN: no rashes, no lesions    LABS:  Last Basic Metabolic Panel:  Lab Results   Component Value Date     05/24/2017      Lab Results   Component Value Date    POTASSIUM 3.9 05/24/2017     Lab Results   Component Value Date    CHLORIDE 106 05/24/2017     Lab Results   Component Value Date    TIESHA 9.0 05/24/2017     Lab Results   Component Value Date    CO2 22 " 05/24/2017     Lab Results   Component Value Date    BUN 18 05/24/2017     Lab Results   Component Value Date    CR 1.14 05/24/2017     Lab Results   Component Value Date    GLC 89 05/24/2017       Lab Results   Component Value Date    TSH 3.63 05/19/2016         All pertinent notes, labs, and images personally reviewed by me.     A/P  Ms.Lori DOE Sears is a 47 year old here for the evaluation of obestiy:    1. Obesity-    Body mass index is 31.09 kg/(m^2).  Obesity is associated with a significant increase in mortality and risk of many disorders, including diabetes mellitus, hypertension, dyslipidemia, heart disease, stroke, sleep apnea, cancer, and many others. Conversely, weight loss is associated with a reduction in obesity-associated morbidity.  Endocrine evaluation of obesity includes Cushing's and thyroid dysfunction.  Will obtain TSH and freeT4 along with 24 hour urine collection.  Will also obtain A1c.  Obesity complicated by HTN ( on medications)  No DM, REY.  Off note she is on Bupropion Xl 150 mg.  -- referral to nutritionist  She needs to work on healthy diet and reduce carbs  The patient is advised to Make better food choices: reduce carbs, Reduce portion size, weight loss and exercise 3-4 times a week.  Consider medical wt loss after above.     Plan: Cortisol free urine, TSH with free T4 reflex,         Hemoglobin A1c, NUTRITION REFERRAL         Discussed:      Obesity is a biological preventable and treatable disease, which is associated with significantly increased risk of many acute and chronic health conditions. Obesity has now been recognized as a chronic disease by the American Medical Association.    A range of serious co-morbidities are associated with obesity including increased risk for hypertension, stroke, coronary artery disease, dyslipidemia, Type II diabetes, depression, sleep apnea, cancers of the colon, breast and endometrium, osteoarthritis and female infertility. Therefore, obesity  is not just a problem; it s a disease that warrants serious evidence based treatements.    I explained to the patient relevant work up for the diagnosis and management of obesity and discussed treatment options. Body Mass Index (BMI) has been a standard means for calculating risk for overweight and obesity. The new American Association of Clinical Endocrinology (AACE) algorithm recommends lifestyle modifications as the initial phase of treatment for all patients with the BMI equal or greater than 25 kg/m2. Lifestyle modifications includes use of medical nutrition therapy, exercise, tobacco cessation, adequate quality and quantity of sleep, limited consumption of alcohol and reduced stress through implementation of a structured, multidisciplinary program.    In patients with complications associated with obesity, graded interventions are recommended including pharmacological therapy such as phentermine, orlistat, lorcaserin and phentermine/topiramate ER, contrave ( buproprion/naltreone) and the use of very low calorie meal replacement programs.    If medical intervention is insufficient, surgical therapy may be considered, especially in patients with BMI greater than or equal to 35 kg/m2 with multiple complications. Surgical treatments include lap-band, gastric sleeve or gastric bypass surgery.    More than 50% of the time spent with Ms. Sears on counseling / coordinating her care.        Follow-up:  After above    Tiana Anderson MD  Endocrinology   [facility]    CC: Trisha Guerra    Addendum to above note and clinic visit:    Labs reviewed.    See result note/telephone encounter.

## 2017-07-17 NOTE — PATIENT INSTRUCTIONS
[facility]   Dr Anderson, Endocrinology Department      [facility]   [address]  [place] [address]  Appointment [phone]  Fax: [phone]  [place]: Monday and Tuesday         [facility]   [address]  [place] [address]  Appointment [phone]  Fax: [phone]  [place]: Wednesday and Thursday          Labs today  Follow up with dietician    24 Hour Urine Collection    - During a 24-hour urine collection, follow your usual diet and drink fluids as you ordinarily would.  - Avoid drinking alcohol before and during the urine collection.    - For a 24-hour urine collection, all of the urine that you pass over a 24-hour time period must be collected. You will receive a special container to collect the sample.    The following are directions for collecting a 24-hour urine sample while at home:    In the morning scheduled to begin the urine collection, urinate in the toilet and flush away the first urine you pass. Write down the date and time. That is the start date and time for the collection.    Collect all urine you pass, day and night, for 24 hours. Use the container given to you to collect the urine. Avoid using other containers. The urine sample must include the last urine that you pass 24 hours after starting the collection. Do not allow toilet paper, stool, or anything else to be added to the urine sample.    Write down the date and time that the last sample is collected.

## 2017-07-17 NOTE — NURSING NOTE
"Chief Complaint   Patient presents with     Referral     Dr Guerra for non morbid obesity        Initial /84  Pulse 71  Temp 98.1  F (36.7  C) (Oral)  Ht 1.683 m (5' 6.25\")  Wt 88 kg (194 lb 1.6 oz)  SpO2 98%  BMI 31.09 kg/m2 Estimated body mass index is 31.09 kg/(m^2) as calculated from the following:    Height as of this encounter: 1.683 m (5' 6.25\").    Weight as of this encounter: 88 kg (194 lb 1.6 oz).  Medication Reconciliation: complete     ENDOCRINOLOGY INTAKE FORM    Patient Name:  Lorena Sears  :  1970    Is patient Diabetic?   No  Does patient have non-diabetic or other endocrine issues?  Yes: non morbid obesity     Vitals: /84  Pulse 71  Temp 98.1  F (36.7  C) (Oral)  Ht 1.683 m (5' 6.25\")  Wt 88 kg (194 lb 1.6 oz)  SpO2 98%  BMI 31.09 kg/m2  BMI= Body mass index is 31.09 kg/(m^2).    Flu vaccine:  No  Pneumonia vaccine:  No    Smoking and Alcohol use:  Social History   Substance Use Topics     Smoking status: Former Smoker     Types: Cigarettes     Quit date: 2011     Smokeless tobacco: Never Used     Alcohol use Yes      Comment: 1 drink a day       OBESITY CONCERNS:  Trisha Guerra       Initial Visit Previous Visit Current Change   Weight 88.043 KG      Height 5' 6.25\"      BMI  31.09        Weight at graduation of high school: 150-155lbs  Diabetes:  No  Sleep Apnea/Snores: No  Hypertension:  No  Hyperlipidemia:  No  Use of steroids:  No  Family history of obesity:  Yes: some are on the heavier side but non are morbid obese   Diet:  Try to eat healthy, low carb   Exercise: Yes: 3-5     Staff Signature:  Betsy Boyer CMA ([facility])        "

## 2017-07-18 ENCOUNTER — OFFICE VISIT - HEALTHEAST (OUTPATIENT)
Dept: PHYSICAL THERAPY | Facility: REHABILITATION | Age: 47
End: 2017-07-18

## 2017-07-18 DIAGNOSIS — R29.898 SHOULDER WEAKNESS: ICD-10-CM

## 2017-07-18 DIAGNOSIS — M25.812 SHOULDER IMPINGEMENT, LEFT: ICD-10-CM

## 2017-07-18 DIAGNOSIS — M25.612 DECREASED ROM OF LEFT SHOULDER: ICD-10-CM

## 2017-07-18 DIAGNOSIS — M25.512 ACUTE PAIN OF LEFT SHOULDER: ICD-10-CM

## 2017-07-18 DIAGNOSIS — R29.3 POOR POSTURE: ICD-10-CM

## 2017-07-20 ENCOUNTER — COMMUNICATION - HEALTHEAST (OUTPATIENT)
Dept: PHYSICAL THERAPY | Facility: REHABILITATION | Age: 47
End: 2017-07-20

## 2017-07-24 DIAGNOSIS — E66.9 NON MORBID OBESITY, UNSPECIFIED OBESITY TYPE: ICD-10-CM

## 2017-07-24 PROCEDURE — 82530 CORTISOL FREE: CPT | Mod: 90 | Performed by: INTERNAL MEDICINE

## 2017-07-24 PROCEDURE — 99000 SPECIMEN HANDLING OFFICE-LAB: CPT | Performed by: INTERNAL MEDICINE

## 2017-07-26 LAB
COLLECT DURATION TIME SPEC: 24 H
CORTIS F 24H UR HPLC-MCNC: 9.17 UG/ML
CORTIS F 24H UR-MRATE: 26.6
CORTIS F/CREAT 24H UR: 26.2
CREAT 24H UR-MCNC: 35 MG/DL
CREAT 24H UR-MRATE: 1015 G/(24.H)
IMP & REVIEW OF LAB RESULTS: NORMAL
SPECIMEN VOL ?TM UR: 2900 ML

## 2017-08-03 ENCOUNTER — OFFICE VISIT - HEALTHEAST (OUTPATIENT)
Dept: PHYSICAL THERAPY | Facility: REHABILITATION | Age: 47
End: 2017-08-03

## 2017-08-03 ENCOUNTER — COMMUNICATION - HEALTHEAST (OUTPATIENT)
Dept: TELEHEALTH | Facility: CLINIC | Age: 47
End: 2017-08-03

## 2017-08-03 DIAGNOSIS — M25.612 DECREASED ROM OF LEFT SHOULDER: ICD-10-CM

## 2017-08-03 DIAGNOSIS — M25.512 ACUTE PAIN OF LEFT SHOULDER: ICD-10-CM

## 2017-08-03 DIAGNOSIS — R29.3 POOR POSTURE: ICD-10-CM

## 2017-08-03 DIAGNOSIS — R29.898 SHOULDER WEAKNESS: ICD-10-CM

## 2017-08-03 DIAGNOSIS — M25.812 SHOULDER IMPINGEMENT, LEFT: ICD-10-CM

## 2017-08-10 ENCOUNTER — OFFICE VISIT - HEALTHEAST (OUTPATIENT)
Dept: PHYSICAL THERAPY | Facility: REHABILITATION | Age: 47
End: 2017-08-10

## 2017-08-10 DIAGNOSIS — M25.612 DECREASED ROM OF LEFT SHOULDER: ICD-10-CM

## 2017-08-10 DIAGNOSIS — R29.3 POOR POSTURE: ICD-10-CM

## 2017-08-10 DIAGNOSIS — M25.812 SHOULDER IMPINGEMENT, LEFT: ICD-10-CM

## 2017-08-10 DIAGNOSIS — R29.898 SHOULDER WEAKNESS: ICD-10-CM

## 2017-08-10 DIAGNOSIS — M25.512 ACUTE PAIN OF LEFT SHOULDER: ICD-10-CM

## 2017-08-14 ENCOUNTER — OFFICE VISIT (OUTPATIENT)
Dept: NUTRITION | Facility: CLINIC | Age: 47
End: 2017-08-14
Payer: COMMERCIAL

## 2017-08-14 DIAGNOSIS — E66.9 NON MORBID OBESITY, UNSPECIFIED OBESITY TYPE: Primary | ICD-10-CM

## 2017-08-14 DIAGNOSIS — Z13.6 CARDIOVASCULAR SCREENING; LDL GOAL LESS THAN 160: ICD-10-CM

## 2017-08-14 DIAGNOSIS — I10 BENIGN HYPERTENSION: ICD-10-CM

## 2017-08-14 PROCEDURE — 97802 MEDICAL NUTRITION INDIV IN: CPT

## 2017-08-14 NOTE — MR AVS SNAPSHOT
After Visit Summary   8/14/2017    Lorena Sears    MRN: [id number]           Patient Information     Date Of Birth          1970        Visit Information        Provider Department      8/14/2017 1:30 PM  [facility]        Today's Diagnoses     Non morbid obesity, unspecified obesity type    -  1    Benign hypertension        CARDIOVASCULAR SCREENING; LDL GOAL LESS THAN 160           Follow-ups after your visit        Additional Services     HEALTH  REFERRAL       Your provider has referred you to a Health .    A Health  will reach out to the patient within three days, if the following criteria has been met.     Clinic: [facility]    Reason for Referral: Obesity/Hypertension    Patient does have knowledge of this service.    Patient Criteria: Obesity, HTN, is planning to start weight loss medication    Provider's Main Focus: Diet/Weight Loss                  Who to contact     If you have questions or need follow up information about today's clinic visit or your schedule please contact [facility] [place] directly at [phone].  Normal or non-critical lab and imaging results will be communicated to you by [employer], letter or phone within 4 business days after the clinic has received the results. If you do not hear from us within 7 days, please contact the clinic through [employer] or phone. If you have a critical or abnormal lab result, we will notify you by phone as soon as possible.  Submit refill requests through [employer] game platform or call your pharmacy and they will forward the refill request to us. Please allow 3 business days for your refill to be completed.          Additional Information About Your Visit        MyChart Information     [employer] game platform gives you secure access to your electronic health record. If you see a primary care provider, you can also send messages to your care team and make appointments. If you have questions, please  call your primary care clinic.  If you do not have a primary care provider, please call [phone] and they will assist you.        Care EveryWhere ID     This is your Care EveryWhere ID. This could be used by other organizations to access your [place] medical records  [id number]         Blood Pressure from Last 3 Encounters:   07/17/17 122/84   05/31/17 118/82   04/27/17 128/86    Weight from Last 3 Encounters:   07/17/17 88 kg (194 lb 1.6 oz)   05/31/17 86.6 kg (191 lb)   04/27/17 86.7 kg (191 lb 2 oz)              We Performed the Following     HEALTH  REFERRAL     MNT INDIVIDUAL INITIAL EA 15 MIN        Primary Care Provider Office Phone # Fax #    Trisha Guerra [phone] [address] [place] [address]        Equal Access to Services     [facility]: Hadii aad ku hadasho Soomaali, waaxda luqadaha, qaybta kaalmada adeegyada, waxay laurelin hayaan kate littlejohn . So [facility] [phone].    ATENCIÓN: Si habla español, tiene a alfred disposición servicios gratuitos de asistencia lingüística. Llame al [phone].    We comply with applicable federal civil rights laws and [place] laws. We do not discriminate on the basis of race, color, national origin, age, disability sex, sexual orientation or gender identity.            Thank you!     Thank you for choosing [facility]  for your care. Our goal is always to provide you with excellent care. Hearing back from our patients is one way we can continue to improve our services. Please take a few minutes to complete the written survey that you may receive in the mail after your visit with us. Thank you!             Your Updated Medication List - Protect others around you: Learn how to safely use, store and throw away your medicines at www.disposemymeds.org.          This list is accurate as of: 8/14/17 11:59 PM.  Always use your most recent med list.                   Brand Name Dispense  Instructions for use Diagnosis    * buPROPion 150 MG 24 hr tablet    WELLBUTRIN XL    90 tablet    Take 1 tablet (150 mg) by mouth every morning    Moderate recurrent major depression (H)       * buPROPion 150 MG 24 hr tablet    WELLBUTRIN XL    90 tablet    Take 1 tablet (150 mg) by mouth daily every morning.    Moderate recurrent major depression (H)       cyclobenzaprine 10 MG tablet    FLEXERIL    90 tablet    Take 1 tablet (10 mg) by mouth nightly as needed for muscle spasms    DDD (degenerative disc disease), lumbar       ibuprofen 200 MG tablet    ADVIL/MOTRIN     Take 200 mg by mouth every 4 hours as needed.        loratadine 10 MG tablet    CLARITIN    90 tablet    Take 1 tablet (10 mg) by mouth daily    Environmental allergies       losartan 50 MG tablet    COZAAR    90 tablet    Take 1 tablet (50 mg) by mouth daily    Benign hypertension       * norethindrone-eth estradiol 0.5-2.5 MG-MCG Tabs     30 tablet    Take 1 tablet by mouth daily    Post-menopause on HRT (hormone replacement therapy)       * norethindrone-ethinyl estradiol 1-5 MG-MCG per tablet    FEMHRT 1/5    90 tablet    Take 1 tablet by mouth daily    Menopause       triamterene-hydrochlorothiazide 37.5-25 MG per tablet    MAXZIDE-25    90 tablet    Take 1 tablet by mouth daily    Active cochlear Meniere's disease, right       zolpidem 10 MG tablet    AMBIEN    30 tablet    Take 1 tablet (10 mg) by mouth at bedtime as needed for sleep.    Primary insomnia       * Notice:  This list has 4 medication(s) that are the same as other medications prescribed for you. Read the directions carefully, and ask your doctor or other care provider to review them with you.

## 2017-08-14 NOTE — Clinical Note
Octaviano Anderson, Just fyi- patient seen for nutrition consultation as requested. She plans to follow up with you regarding weight loss medication. I also referred her to the Health Coaching program.  Thanks! Kristen Tobar RD, CDE Diabetes

## 2017-08-14 NOTE — Clinical Note
Octaviano Narayan,  I saw this patient in [place] this past Mon. She expressed some interest in Health Coaching. She is struggling with behavior change for weight mgmt. She will be seeing Dr. Anderson to start weight loss medication.  Thanks! Kristen Tobar RD, CDE Diabetes

## 2017-08-14 NOTE — PROGRESS NOTES
"MEDICAL NUTRITION THERAPY  Visit Type:Initial assessment and intervention    SUBJECTIVE:   Lorena Sears presents today for MNT and education related to weight management.   She is accompanied by self.     PATIENT STATED GOAL(S) FOR THIS VISIT: has tried restrictive diets in the past, is frustrated- and wants to consider weight loss medication.    EATING HABITS:    Patient admits using food in the evening for stress relief, comfort. Mindless eating, consumes meals quickly while doing other things.  Patient brought in the following 3 day food record:        Misses meals? no  Eats out:  Enjoys eating out and having some cocktails on the week-ends     Previous diet education:  No     Food allergies/intolerances: no    EXERCISE:   Has long work days, but does enjoy working out at the gym 3-5x/week- however has also used this as justification \"to eat more\"    SOCIO/ECONOMIC:   Lives with: life partner    OBJECTIVE:   Weight Change:   Wt Readings from Last 3 Encounters:   07/17/17 88 kg (194 lb 1.6 oz)   05/31/17 86.6 kg (191 lb)   04/27/17 86.7 kg (191 lb 2 oz)     ASSESSMENT:   Patient has a good understanding of healthy eating, does enjoy cooking. Patient commented several times \"I know what to do, I just get really stuck\". Has been avoiding social situations more frequently because of her weight. Has seen Endo to discuss weight loss medication, which she may be a good candidate if indicated.     VERBAL AND WRITTEN INFORMATION GIVEN TO SUPPORT:  Discussed: general nutrition guidelines, weight reduction, carb counting, labeling, ETOH, exercise, portion control. Most of time spent on behavior change process, working thru barriers, ways to cope with stress other than mindless eating, taking small action steps and staying focused.    Education Materials Provided: 100 Calorie Snacks, Weight Loss Tips, Carbohydrate Counting and Managing Your Weight    PLAN:   PATIENT'S BEHAVIOR CHANGE GOALS:   1. Have a more substantial " breakfast and lunch to help prevent excessive hunger later in the day.   2. Reduce ETOH intake.  3. Avoid mindless eating, take time to enjoy meals, find alternatives to food for coping with stress or difficult feelings.     FOLLOW UP:   Follow up with RD as needed.  Call RD with questions/concerns.   Referral to Health  is recommended.  Follow up with Dr. Anderson to discuss weight loss medication.    Kristen Tobar RD, CDE  Diabetes     Time spent in minutes: 35  Encounter: Individual

## 2017-08-15 ENCOUNTER — OFFICE VISIT - HEALTHEAST (OUTPATIENT)
Dept: PHYSICAL THERAPY | Facility: REHABILITATION | Age: 47
End: 2017-08-15

## 2017-08-15 DIAGNOSIS — R29.898 SHOULDER WEAKNESS: ICD-10-CM

## 2017-08-15 DIAGNOSIS — M25.512 ACUTE PAIN OF LEFT SHOULDER: ICD-10-CM

## 2017-08-15 DIAGNOSIS — R29.3 POOR POSTURE: ICD-10-CM

## 2017-08-15 DIAGNOSIS — M25.612 DECREASED ROM OF LEFT SHOULDER: ICD-10-CM

## 2017-08-15 DIAGNOSIS — M25.812 SHOULDER IMPINGEMENT, LEFT: ICD-10-CM

## 2017-08-16 ENCOUNTER — MYC MEDICAL ADVICE (OUTPATIENT)
Dept: ENDOCRINOLOGY | Facility: CLINIC | Age: 47
End: 2017-08-16

## 2017-08-18 ENCOUNTER — TELEPHONE (OUTPATIENT)
Dept: NURSING | Facility: CLINIC | Age: 47
End: 2017-08-18

## 2017-08-18 NOTE — TELEPHONE ENCOUNTER
She should make an appointment with Dr Anderson to discuss medication options and start one of the meds.  Soledad Guy NP  Endocrinology

## 2017-08-28 ENCOUNTER — TELEPHONE (OUTPATIENT)
Dept: NURSING | Facility: CLINIC | Age: 47
End: 2017-08-28

## 2017-08-29 ENCOUNTER — VIRTUAL VISIT (OUTPATIENT)
Dept: NURSING | Facility: CLINIC | Age: 47
End: 2017-08-29

## 2017-08-29 DIAGNOSIS — E66.9 NON MORBID OBESITY, UNSPECIFIED OBESITY TYPE: Primary | ICD-10-CM

## 2017-08-29 DIAGNOSIS — I10 BENIGN HYPERTENSION: ICD-10-CM

## 2017-08-29 PROCEDURE — 99207 ZZC HEALTH COACHING, NO CHARGE: CPT

## 2017-08-29 NOTE — PROGRESS NOTES
August 29, 2017    [facility]  [address] [place] [address] [id number] [phone]  Health Coaching Progress Note    Patient Name: Lorena Sears Date: August 29, 2017      Session Length: 30      DATA    PRM Master Survey Scores Reviewed: Yes    Core Healthy Days Survey:    Would you say that in general your health is: : Fair    JUANA Score (Last Two) 4/22/2011 8/29/2017   JUANA Raw Score 41 29   Activation Score 63.2 52.9   JUANA Level 3 2       PHQ-2 Score 8/29/2017 5/31/2017   PHQ-2 Total Score Interpretation - Positive if 3 or more points; Administer PHQ-9 if positive 2 3   MyC PHQ-2 Score - -       PHQ-9 SCORE 5/2/2017 5/31/2017   Total Score - -   Total Score MyChart - -   Total Score 10 9       Treatment Objective(s) Addressed in This Session:  Target Behavior(s): diet/weight loss and disease management/lifestyle changes of conituing regular exercise routine-adding different modes of exercise, resistance, interval training-switching up routine, diet-making healthy eating choices, cutting back on carbs, watching portion sizes, weight loss and maintenance and accessing resources.    Current Stressors / Issues:  Has been working out regularly-but doesn't see weight loss, frustrated with no results, portion sizes, diet-could make healthier choices, carb intake,  portion size control, cutting back on alcohol drinks-dead calories, changes with her body, losing weight and maintaining results.    What Patient Does Well:   1) Patient already has a regular exercise routine she follows each week  Previous Successes:   1) Patient states that she lost 35lbs in the past by following a restrictive low-carb diet, but couldn't maintain.  Areas in Need of Improvement:   1) Mixing up exercise-not doing same routine every time-body has adapted  2) Applying a healthy diet with activity-less carbs/more protein and fresh foods  3) Weight management  Barriers  to Change:   1) Pt. exercises already but doesn't see any changes  2) Motivation  3) Stressful job  Reasons for Change:   1) Lose weight  2) Feel better  3) Avoid other health issues/be healthier  Plan/Goal for the Next 4 Weeks:   GOAL #1: Try to mix up exercise routine by adding different stimulus-change type of machine, time on machine, resistance, incorporate interval training, etc  GOAL #1 Progress Toward Goal: 0% New Goal  GOAL #2: Be more aware of food choices and work on cutitng back on portion sizes (try using a smaller plate)  GOAL #2 Progress Toward Goal: 0% New Goal    Intervention:  Motivational Interviewing    MI Intervention: Expressed Empathy/Understanding, Supported Autonomy, Collaboration, Evocation, Permission to raise concern or advise, Open-ended questions, Reflections: simple and complex and Change talk (evoked)     Change Talk Expressed by the Patient: Desire to change Ability to change Reasons to change Need to change Committment to change Activation Taking steps    Provider Response to Change Talk: E - Evoked more info from patient about behavior change, A - Affirmed patient's thoughts, decisions, or attempts at behavior change, R - Reflected patient's change talk and S - Summarized patient's change talk statements    Assessment / Progress on Treatment Objective(s) / Homework:    New Objective established this session - PREPARATION (Decided to change - considering how); Intervened by negotiating a change plan and determining options / strategies for behavior change, identifying triggers, exploring social supports, and working towards setting a date to begin behavior change         Plan: (Homework, other):  Patient was encouraged to continue to seek condition-related information and education, as well as schedule a follow up appointment with the Health  in 4 weeks. Patient has set self-identified goals and will monitor progress until the next appointment.  Scheduled our next coaching  session for Thursday September 28th 2pm.      Sylvain Mejia

## 2017-08-29 NOTE — MR AVS SNAPSHOT
After Visit Summary   8/29/2017    Lorena Sears    MRN: [id number]           Patient Information     Date Of Birth          1970        Visit Information        Provider Department      8/29/2017 3:00 PM HEALTH  - [facility]        Today's Diagnoses     Non morbid obesity, unspecified obesity type    -  1    Benign hypertension          Care Instructions      August 29, 2017    [address] [id number] [phone]  Health Coaching Progress Note    Patient Name: Lorena Seras Date: August 29, 2017      Plan/Goal for the Next 4 Weeks:   GOAL #1: Try to mix up exercise routine by adding different stimulus-change type of machine, time on machine, resistance, incorporate interval training, etc  GOAL #1 Progress Toward Goal: New Goal  GOAL #2: Be more aware of food choices and work on cutitng back on portion sizes (try using a smaller plate)  GOAL #2 Progress Toward Goal: New Goal      Plan: (Homework, other):  Patient was encouraged to continue to seek condition-related information and education, as well as schedule a follow up appointment with the Health  in 4 weeks. Patient has set self-identified goals and will monitor progress until the next appointment.  Scheduled our next coaching call for Thursday September 28th at 2pm over the phone.  Sylvain Mejia       Resources:                Follow-ups after your visit        Who to contact     If you have questions or need follow up information about today's clinic visit or your schedule please contact [facility] directly at [phone].  Normal or non-critical lab and imaging results will be communicated to you by MyChart, letter or phone within 4 business days after the clinic has received the results. If you do not hear from us within 7 days, please contact the clinic through [employer] or  phone. If you have a critical or abnormal lab result, we will notify you by phone as soon as possible.  Submit refill requests through [employer] or call your pharmacy and they will forward the refill request to us. Please allow 3 business days for your refill to be completed.          Additional Information About Your Visit        [employer] Information     [employer] gives you secure access to your electronic health record. If you see a primary care provider, you can also send messages to your care team and make appointments. If you have questions, please call your primary care clinic.  If you do not have a primary care provider, please call [phone] and they will assist you.        Care EveryWhere ID     This is your Care EveryWhere ID. This could be used by other organizations to access your [place] medical records  [id number]         Blood Pressure from Last 3 Encounters:   07/17/17 122/84   05/31/17 118/82   04/27/17 128/86    Weight from Last 3 Encounters:   07/17/17 194 lb 1.6 oz (88 kg)   05/31/17 191 lb (86.6 kg)   04/27/17 191 lb 2 oz (86.7 kg)              Today, you had the following     No orders found for display       Primary Care Provider Office Phone # Fax #    Trisha Guerra [phone] [address] [place] [address]        Equal Access to Services     [facility]: Hadii aad ku hadasho Soomaali, waaxda luqadaha, qaybta kaalmada adeegyada, harriet mosley hayilya doe. So [facility] [phone].    ATENCIÓN: Si habla español, tiene a alfred disposición servicios gratuitos de asistencia lingüística. Llame al [phone].    We comply with applicable federal civil rights laws and [place] laws. We do not discriminate on the basis of race, color, national origin, age, disability sex, sexual orientation or gender identity.            Thank you!     Thank you for choosing [facility]  for your care. Our goal is always to provide you  with excellent care. Hearing back from our patients is one way we can continue to improve our services. Please take a few minutes to complete the written survey that you may receive in the mail after your visit with us. Thank you!             Your Updated Medication List - Protect others around you: Learn how to safely use, store and throw away your medicines at www.disposemymeds.org.          This list is accurate as of: 8/29/17  4:24 PM.  Always use your most recent med list.                   Brand Name Dispense Instructions for use Diagnosis    * buPROPion 150 MG 24 hr tablet    WELLBUTRIN XL    90 tablet    Take 1 tablet (150 mg) by mouth every morning    Moderate recurrent major depression (H)       * buPROPion 150 MG 24 hr tablet    WELLBUTRIN XL    90 tablet    Take 1 tablet (150 mg) by mouth daily every morning.    Moderate recurrent major depression (H)       cyclobenzaprine 10 MG tablet    FLEXERIL    90 tablet    Take 1 tablet (10 mg) by mouth nightly as needed for muscle spasms    DDD (degenerative disc disease), lumbar       ibuprofen 200 MG tablet    ADVIL/MOTRIN     Take 200 mg by mouth every 4 hours as needed.        loratadine 10 MG tablet    CLARITIN    90 tablet    Take 1 tablet (10 mg) by mouth daily    Environmental allergies       losartan 50 MG tablet    COZAAR    90 tablet    Take 1 tablet (50 mg) by mouth daily    Benign hypertension       * norethindrone-eth estradiol 0.5-2.5 MG-MCG Tabs     30 tablet    Take 1 tablet by mouth daily    Post-menopause on HRT (hormone replacement therapy)       * norethindrone-ethinyl estradiol 1-5 MG-MCG per tablet    FEMHRT 1/5    90 tablet    Take 1 tablet by mouth daily    Menopause       triamterene-hydrochlorothiazide 37.5-25 MG per tablet    MAXZIDE-25    90 tablet    Take 1 tablet by mouth daily    Active cochlear Meniere's disease, right       zolpidem 10 MG tablet    AMBIEN    30 tablet    Take 1 tablet (10 mg) by mouth at bedtime as needed for  sleep.    Primary insomnia       * Notice:  This list has 4 medication(s) that are the same as other medications prescribed for you. Read the directions carefully, and ask your doctor or other care provider to review them with you.

## 2017-08-29 NOTE — PATIENT INSTRUCTIONS
August 29, 2017    [address] [address]  [phone]  Health Coaching Progress Note    Patient Name: Lorena Sears Date: August 29, 2017      Plan/Goal for the Next 4 Weeks:   GOAL #1: Try to mix up exercise routine by adding different stimulus-change type of machine, time on machine, resistance, incorporate interval training, etc  GOAL #1 Progress Toward Goal: New Goal  GOAL #2: Be more aware of food choices and work on cutitng back on portion sizes (try using a smaller plate)  GOAL #2 Progress Toward Goal: New Goal      Plan: (Homework, other):  Patient was encouraged to continue to seek condition-related information and education, as well as schedule a follow up appointment with the Health  in 4 weeks. Patient has set self-identified goals and will monitor progress until the next appointment.  Scheduled our next coaching call for Thursday September 28th at 2pm over the phone.  Sylvain Mejia       Resources:

## 2017-08-30 ENCOUNTER — TELEPHONE (OUTPATIENT)
Dept: ENDOCRINOLOGY | Facility: CLINIC | Age: 47
End: 2017-08-30

## 2017-08-30 NOTE — TELEPHONE ENCOUNTER
Patient calling to inform Dr Anderson that she had the lab work done and also saw the dietitian. She now would like to start the medications that were discussed at her last appointment that she had with Dr Anderson. Please advise and call patient back with response.

## 2017-09-28 ENCOUNTER — TELEPHONE (OUTPATIENT)
Dept: NURSING | Facility: CLINIC | Age: 47
End: 2017-09-28

## 2017-10-03 ENCOUNTER — OFFICE VISIT (OUTPATIENT)
Dept: ENDOCRINOLOGY | Facility: CLINIC | Age: 47
End: 2017-10-03
Payer: COMMERCIAL

## 2017-10-03 VITALS
TEMPERATURE: 98.8 F | HEART RATE: 89 BPM | OXYGEN SATURATION: 98 % | DIASTOLIC BLOOD PRESSURE: 88 MMHG | WEIGHT: 199 LBS | HEIGHT: 66 IN | SYSTOLIC BLOOD PRESSURE: 122 MMHG | BODY MASS INDEX: 31.98 KG/M2

## 2017-10-03 DIAGNOSIS — I10 BENIGN HYPERTENSION: ICD-10-CM

## 2017-10-03 DIAGNOSIS — E66.9 NON MORBID OBESITY, UNSPECIFIED OBESITY TYPE: Primary | ICD-10-CM

## 2017-10-03 DIAGNOSIS — F51.01 PRIMARY INSOMNIA: ICD-10-CM

## 2017-10-03 PROCEDURE — 99214 OFFICE O/P EST MOD 30 MIN: CPT | Performed by: INTERNAL MEDICINE

## 2017-10-03 RX ORDER — ZOLPIDEM TARTRATE 10 MG/1
10 TABLET ORAL
Qty: 30 TABLET | Refills: 5 | Status: SHIPPED | OUTPATIENT
Start: 2017-10-03 | End: 2018-04-03

## 2017-10-03 RX ORDER — PHENTERMINE HYDROCHLORIDE 37.5 MG/1
37.5 TABLET ORAL
Qty: 31 TABLET | Refills: 3 | Status: SHIPPED | OUTPATIENT
Start: 2017-10-03 | End: 2018-01-09

## 2017-10-03 NOTE — PATIENT INSTRUCTIONS
[facility]   Dr Anderson, Endocrinology Department      [facility]   [address]  [place] [address]  Appointment [phone]  Fax: [phone]  [place]: Monday and Tuesday         [facility]   [address]  [place] [address]  Appointment [phone]  Fax: [phone]  [place]: Wednesday and Thursday            Will start on Phentermine 0.5 tab /day X 1 week. Then 1 tab/day. All side effects including risk for HTN, palpitations, ischemic events, insomnia, CP, dry mouth, risk for valvular heart disease, restlessness etc.were discussed in detailed. There is also a abuse potential and patient was instructed to use the medication as prescribed.     The patient is advised to Make better food choices: reduce carbs, Reduce portion size, weight loss and exercise 3-4 times a week.    Follow up in 3 months.

## 2017-10-03 NOTE — MR AVS SNAPSHOT
After Visit Summary   10/3/2017    Lorena Sears    MRN: [id number]           Patient Information     Date Of Birth          1970        Visit Information        Provider Department      10/3/2017 11:00 AM Tiana Anderson MD [facility]        Today's Diagnoses     Non morbid obesity, unspecified obesity type    -  1      Care Instructions    [facility] [place] locations   Dr Anderson, Endocrinology Department      [facility]   [address]  [place] [address]  Appointment [phone]  Fax: [phone]  [place]: Monday and Tuesday         [facility]   [address]  [place] [address]  Appointment [phone]  Fax: [phone]  [place]: Wednesday and Thursday            Will start on Phentermine 0.5 tab /day X 1 week. Then 1 tab/day. All side effects including risk for HTN, palpitations, ischemic events, insomnia, CP, dry mouth, risk for valvular heart disease, restlessness etc.were discussed in detailed. There is also a abuse potential and patient was instructed to use the medication as prescribed.     The patient is advised to Make better food choices: reduce carbs, Reduce portion size, weight loss and exercise 3-4 times a week.    Follow up in 3 months.            Follow-ups after your visit        Who to contact     If you have questions or need follow up information about today's clinic visit or your schedule please contact [facility] directly at [phone].  Normal or non-critical lab and imaging results will be communicated to you by MyChart, letter or phone within 4 business days after the clinic has received the results. If you do not hear from us within 7 days, please contact the clinic through MyChart or phone. If you have a critical or abnormal lab result, we will notify you by phone as soon as possible.  Submit refill requests  "through [employer] or call your pharmacy and they will forward the refill request to us. Please allow 3 business days for your refill to be completed.          Additional Information About Your Visit        [employer] Information     [employer] gives you secure access to your electronic health record. If you see a primary care provider, you can also send messages to your care team and make appointments. If you have questions, please call your primary care clinic.  If you do not have a primary care provider, please call [phone] and they will assist you.        Care EveryWhere ID     This is your Care EveryWhere ID. This could be used by other organizations to access your [place] medical records  [id number]        Your Vitals Were     Pulse Temperature Height Pulse Oximetry BMI (Body Mass Index)       89 98.8  F (37.1  C) (Oral) 1.683 m (5' 6.25\") 98% 31.88 kg/m2        Blood Pressure from Last 3 Encounters:   10/03/17 122/88   07/17/17 122/84   05/31/17 118/82    Weight from Last 3 Encounters:   10/03/17 90.3 kg (199 lb)   07/17/17 88 kg (194 lb 1.6 oz)   05/31/17 86.6 kg (191 lb)              Today, you had the following     No orders found for display         Today's Medication Changes          These changes are accurate as of: 10/3/17 11:34 AM.  If you have any questions, ask your nurse or doctor.               Start taking these medicines.        Dose/Directions    phentermine 37.5 MG tablet   Commonly known as:  ADIPEX-P   Used for:  Non morbid obesity, unspecified obesity type   Started by:  Tiana Anderson MD        Dose:  37.5 mg   Take 1 tablet (37.5 mg) by mouth every morning (before breakfast)   Quantity:  31 tablet   Refills:  3            Where to get your medicines      Some of these will need a paper prescription and others can be bought over the counter.  Ask your nurse if you have questions.     Bring a paper prescription for each of these medications     phentermine 37.5 MG tablet             "    Primary Care Provider Office Phone # Fax #    Trisha Guerra [phone] [address] [place] [address]        Equal Access to Services     ADRIENNETAHIR SONALI : Dalton chaim reed nia Kern, waleslieda luqroberta, qaybta kaalmada lisa, harriet crews labryceandreia nader. So [facility] [phone].    ATENCIÓN: Si habla español, tiene a alfred disposición servicios gratuitos de asistencia lingüística. Llame al [phone].    We comply with applicable federal civil rights laws and [place] laws. We do not discriminate on the basis of race, color, national origin, age, disability, sex, sexual orientation, or gender identity.            Thank you!     Thank you for choosing [facility]  for your care. Our goal is always to provide you with excellent care. Hearing back from our patients is one way we can continue to improve our services. Please take a few minutes to complete the written survey that you may receive in the mail after your visit with us. Thank you!             Your Updated Medication List - Protect others around you: Learn how to safely use, store and throw away your medicines at www.disposemymeds.org.          This list is accurate as of: 10/3/17 11:34 AM.  Always use your most recent med list.                   Brand Name Dispense Instructions for use Diagnosis    * buPROPion 150 MG 24 hr tablet    WELLBUTRIN XL    90 tablet    Take 1 tablet (150 mg) by mouth every morning    Moderate recurrent major depression (H)       * buPROPion 150 MG 24 hr tablet    WELLBUTRIN XL    90 tablet    Take 1 tablet (150 mg) by mouth daily every morning.    Moderate recurrent major depression (H)       cyclobenzaprine 10 MG tablet    FLEXERIL    90 tablet    Take 1 tablet (10 mg) by mouth nightly as needed for muscle spasms    DDD (degenerative disc disease), lumbar       ibuprofen 200 MG tablet    ADVIL/MOTRIN     Take 200 mg by mouth every 4 hours as needed.         loratadine 10 MG tablet    CLARITIN    90 tablet    Take 1 tablet (10 mg) by mouth daily    Environmental allergies       losartan 50 MG tablet    COZAAR    90 tablet    Take 1 tablet (50 mg) by mouth daily    Benign hypertension       * norethindrone-eth estradiol 0.5-2.5 MG-MCG Tabs     30 tablet    Take 1 tablet by mouth daily    Post-menopause on HRT (hormone replacement therapy)       * norethindrone-ethinyl estradiol 1-5 MG-MCG per tablet    FEMHRT 1/5    90 tablet    Take 1 tablet by mouth daily    Menopause       phentermine 37.5 MG tablet    ADIPEX-P    31 tablet    Take 1 tablet (37.5 mg) by mouth every morning (before breakfast)    Non morbid obesity, unspecified obesity type       triamterene-hydrochlorothiazide 37.5-25 MG per tablet    MAXZIDE-25    90 tablet    Take 1 tablet by mouth daily    Active cochlear Meniere's disease, right       zolpidem 10 MG tablet    AMBIEN    30 tablet    Take 1 tablet (10 mg) by mouth at bedtime as needed for sleep.    Primary insomnia       * Notice:  This list has 4 medication(s) that are the same as other medications prescribed for you. Read the directions carefully, and ask your doctor or other care provider to review them with you.

## 2017-10-03 NOTE — PROGRESS NOTES
Name: Lorena Sears  Seen for f/u of obesity.  HPI:  Lorena Sears is a 47 year old female who presents for the evaluation of obestiy.  Body mass index is 31.88 kg/(m^2).  Wt Readings from Last 2 Encounters:   10/03/17 90.3 kg (199 lb)   07/17/17 88 kg (194 lb 1.6 oz)     Growing up was overweight.  20-30 lbs more than normal wt.  H/o early menopause.  No children.  Gradual wt gain over last few years  Likes to cook and eat.  When back from home- tired and not eating healthy.  Snacking at work.  24 hr UFC was normal.  Thyroid labs in normal range.    No DM.  + HTN, on medication. Well controlled.    Menses: early menopause at age 42.  Diarrhea/Constipation:Yes: more constipation  Changes in Hair or Skin:No. Maybe more dry skin  Diabetes:No  Sleep: use Ambien. 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  Hypertension:Yes: on medication  Hyperlipidemia:No  Hirsutism:No  Easy Brusing:Yes: thinks yes  Use of Steroids:No  Family history of Obesity:Yes: brother is obese. other family memerbs overwt  Diet: mostly home food. 2-3 meals a day. Snacks at work. More eating as a part of comfort measure.   Exercise:Yes: gym 3- 5 times a week. 30-50 min cross fit.  PMH/PSH:  History reviewed. No pertinent past medical history.  Past Surgical History:   Procedure Laterality Date     WRIST SURGERY      cyst removal on right wrist     Family Hx:  Family History   Problem Relation Age of Onset     Hypertension Mother      Thyroid Disease Mother      Hypertension Father      CANCER Father      non Hodgkin lymphoma./waldenstroms     Breast Cancer Maternal Grandmother      older at diagnosis     Alzheimer Disease Maternal Grandfather      Neurologic Disorder Maternal Grandfather      parkinsons     CANCER Maternal Grandfather      Alzheimer Disease Paternal Grandmother      CANCER Paternal Grandfather      Breast Cancer Maternal Aunt 50            Social Hx:  Social History     Social History  "    Marital status: Life Partner     Spouse name: N/A     Number of children: N/A     Years of education: N/A     Occupational History     Not on file.     Social History Main Topics     Smoking status: Former Smoker     Types: Cigarettes     Quit date: 8/1/2011     Smokeless tobacco: Never Used     Alcohol use Yes      Comment: 1 drink a day     Drug use: No     Sexual activity: Yes     Partners: Female     Other Topics Concern     Parent/Sibling W/ Cabg, Mi Or Angioplasty Before 65f 55m? No     Social History Narrative          MEDICATIONS:  has a current medication list which includes the following prescription(s): norethindrone-ethinyl estradiol, bupropion, losartan, cyclobenzaprine, norethindrone-eth estradiol, zolpidem, loratadine, ibuprofen, triamterene-hydrochlorothiazide, and bupropion.    ROS     ROS: 10 point ROS neg other than the symptoms noted above in the HPI.    Physical Exam   VS: /88 (BP Location: Right arm, Patient Position: Chair, Cuff Size: Adult Large)  Pulse 89  Temp 98.8  F (37.1  C) (Oral)  Ht 1.683 m (5' 6.25\")  Wt 90.3 kg (199 lb)  SpO2 98%  BMI 31.88 kg/m2  GENERAL: AXOX3, NAD, well dressed, answering questions appropriately, appears stated age.  HEENT: No exopthalmous, no proptosis, EOMI, no lig lag, no retraction  NECK: Thyroid normal in size, non tender, no nodules were palpated.  CV: RRR  LUNGS: CTAB  ABDOMEN: +BS  NEUROLOGY: CN grossly intact, no tremors  PSYCH: normal affect and mood      LABS:  Component      Latest Ref Rng & Units 7/24/2017   Cortisol Free Duration Urine      h 24   Volume      mL 2900   Cortisol ug/g creatinine       26.20   Cortisol Free Urine Random       9.17   Cortisol Free Urine       26.6   Creat/Vol       35   Creat/24hr       1015     Lab Results   Component Value Date    A1C 4.9 07/17/2017     Lab Results   Component Value Date    TSH 2.66 07/17/2017       Last Basic Metabolic Panel:  Lab Results   Component Value Date     05/24/2017    "   Lab Results   Component Value Date    POTASSIUM 3.9 05/24/2017     Lab Results   Component Value Date    CHLORIDE 106 05/24/2017     Lab Results   Component Value Date    TIESHA 9.0 05/24/2017     Lab Results   Component Value Date    CO2 22 05/24/2017     Lab Results   Component Value Date    BUN 18 05/24/2017     Lab Results   Component Value Date    CR 1.14 05/24/2017     Lab Results   Component Value Date    GLC 89 05/24/2017       Lab Results   Component Value Date    TSH 3.63 05/19/2016         All pertinent notes, labs, and images personally reviewed by me.     A/P  Ms.Lori DOE Sears is a 47 year old here for the evaluation of obestiy:    1. Obesity-    Body mass index is 31.88 kg/(m^2).  Obesity is associated with a significant increase in mortality and risk of many disorders, including diabetes mellitus, hypertension, dyslipidemia, heart disease, stroke, sleep apnea, cancer, and many others. Conversely, weight loss is associated with a reduction in obesity-associated morbidity.  Endocrine evaluation of obesity includes Cushing's and thyroid dysfunction.    24 hr UFC was normal.  Thyroid labs in normal range.  Obesity complicated by HTN ( on medications)  No DM, REY.  Off note she is on Bupropion Xl 150 mg.    Plan:  Start phentermine 37.5 mg/day (10/3/2017) wt 199 lbs  Discussed indications, risks and benefits of all medications prescribed, and answered questions to patient's satisfaction.  H/o hypertension, well controlled on medication  History of PVC- control  A co-2011 was in normal range  No history of Valvular heart disease.  No family history of CAD.    All side effects including risk for HTN, palpitations, ischemic events, insomnia, CP, dry mouth, risk for valvular heart disease, restlessness etc.were discussed in detailed. There is also a abuse potential and patient was instructed to use the medication as prescribed.     The patient indicates understanding of these issues and agrees with the  plan.    Discussed:      Obesity is a biological preventable and treatable disease, which is associated with significantly increased risk of many acute and chronic health conditions. Obesity has now been recognized as a chronic disease by the American Medical Association.    A range of serious co-morbidities are associated with obesity including increased risk for hypertension, stroke, coronary artery disease, dyslipidemia, Type II diabetes, depression, sleep apnea, cancers of the colon, breast and endometrium, osteoarthritis and female infertility. Therefore, obesity is not just a problem; it s a disease that warrants serious evidence based treatements.    I explained to the patient relevant work up for the diagnosis and management of obesity and discussed treatment options. Body Mass Index (BMI) has been a standard means for calculating risk for overweight and obesity. The new American Association of Clinical Endocrinology (AACE) algorithm recommends lifestyle modifications as the initial phase of treatment for all patients with the BMI equal or greater than 25 kg/m2. Lifestyle modifications includes use of medical nutrition therapy, exercise, tobacco cessation, adequate quality and quantity of sleep, limited consumption of alcohol and reduced stress through implementation of a structured, multidisciplinary program.    In patients with complications associated with obesity, graded interventions are recommended including pharmacological therapy such as phentermine, orlistat, lorcaserin and phentermine/topiramate ER, contrave ( buproprion/naltreone) and the use of very low calorie meal replacement programs.    If medical intervention is insufficient, surgical therapy may be considered, especially in patients with BMI greater than or equal to 35 kg/m2 with multiple complications. Surgical treatments include lap-band, gastric sleeve or gastric bypass surgery.    More than 50% of the time spent with Ms. Sears on  counseling / coordinating her care.        Follow-up:  3 months    Tiana Anderson MD  Endocrinology   [facility]    CC: Trisha Guerra

## 2017-10-03 NOTE — TELEPHONE ENCOUNTER
Spoke to Pt. Informed her that Rx was faxed to [facility] pharmacy. Pt confirmed understanding.     Elayne Ayers

## 2017-10-03 NOTE — NURSING NOTE
"Chief Complaint   Patient presents with     RECHECK     follow up weight LOV 17       Initial /88 (BP Location: Right arm, Patient Position: Chair, Cuff Size: Adult Large)  Pulse 89  Temp 98.8  F (37.1  C) (Oral)  Ht 1.683 m (5' 6.25\")  Wt 90.3 kg (199 lb)  SpO2 98%  BMI 31.88 kg/m2 Estimated body mass index is 31.88 kg/(m^2) as calculated from the following:    Height as of this encounter: 1.683 m (5' 6.25\").    Weight as of this encounter: 90.3 kg (199 lb).  Medication Reconciliation: complete     ENDOCRINOLOGY INTAKE FORM    Patient Name:  Lorena Sears  :  1970    Is patient Diabetic?   No  Does patient have non-diabetic or other endocrine issues?  Yes: weight     Vitals: /88 (BP Location: Right arm, Patient Position: Chair, Cuff Size: Adult Large)  Pulse 89  Temp 98.8  F (37.1  C) (Oral)  Ht 1.683 m (5' 6.25\")  Wt 90.3 kg (199 lb)  SpO2 98%  BMI 31.88 kg/m2  BMI= Body mass index is 31.88 kg/(m^2).    Flu vaccine:  No  Pneumonia vaccine:  No    Smoking and Alcohol use:  Social History   Substance Use Topics     Smoking status: Former Smoker     Types: Cigarettes     Quit date: 2011     Smokeless tobacco: Never Used     Alcohol use Yes      Comment: 1 drink a day       Lab Results   Component Value Date    A1C 4.9 2017       Lab Results   Component Value Date    MICROL <5 2017     No results found for: MICROALBUMIN    OBESITY CONCERNS:  No ref. provider found       Initial Visit Previous Visit Current Change   Weight 194  199 +5   Height       BMI 31.09  31.88 +0.79     Weight at graduation of high school: 150-155  Diabetes:  No  Sleep Apnea/Snores: No  Hypertension:  No  Hyperlipidemia:  No  Use of steroids:  No  Family history of obesity:  Yes:   Diet:  Low Carb trying to eat healthy   Exercise: Yes:     Staff Signature:  Betsy Boyer CMA (AAMA)        "

## 2017-10-16 ENCOUNTER — TELEPHONE (OUTPATIENT)
Dept: NURSING | Facility: CLINIC | Age: 47
End: 2017-10-16

## 2017-10-27 ENCOUNTER — MYC MEDICAL ADVICE (OUTPATIENT)
Dept: PEDIATRICS | Facility: CLINIC | Age: 47
End: 2017-10-27

## 2017-10-27 NOTE — LETTER
[address] [address]  [phone]                       To whom it may concern,           Lorena Sears 1970 is under my care for recurrent tension headache. She would benefit from massage therapy and chiropractic care to decrease her headache frequency.        Associated Diagnoses:  Headache - Code: R51  Neck pain - Code: M54.2           Sincerely,              Trisha Guerra MD

## 2017-10-27 NOTE — LETTER
[address] [address]  [phone]            November 1,2017          To whom it may concern,           Lorena Sears 1970 is under my care for recurrent tension headache. She would benefit from massage therapy and chiropractic care to decrease her headache frequency.        Associated Diagnoses:  Headache - Code: R51  Neck pain - Code: M54.2           Sincerely,              Trisha Guerra MD

## 2017-11-01 ENCOUNTER — MYC MEDICAL ADVICE (OUTPATIENT)
Dept: PEDIATRICS | Facility: CLINIC | Age: 47
End: 2017-11-01

## 2017-11-01 NOTE — TELEPHONE ENCOUNTER
Letter with diagnosis codes were mailed to pt's address today.    Jaimie, RN  Triage Nurse

## 2018-01-09 ENCOUNTER — OFFICE VISIT (OUTPATIENT)
Dept: ENDOCRINOLOGY | Facility: CLINIC | Age: 48
End: 2018-01-09
Payer: COMMERCIAL

## 2018-01-09 VITALS
WEIGHT: 187.6 LBS | HEIGHT: 66 IN | DIASTOLIC BLOOD PRESSURE: 84 MMHG | HEART RATE: 93 BPM | BODY MASS INDEX: 30.15 KG/M2 | SYSTOLIC BLOOD PRESSURE: 110 MMHG | OXYGEN SATURATION: 97 % | TEMPERATURE: 98.3 F

## 2018-01-09 DIAGNOSIS — E66.9 NON MORBID OBESITY, UNSPECIFIED OBESITY TYPE: ICD-10-CM

## 2018-01-09 PROCEDURE — 99213 OFFICE O/P EST LOW 20 MIN: CPT | Performed by: INTERNAL MEDICINE

## 2018-01-09 RX ORDER — PHENTERMINE HYDROCHLORIDE 37.5 MG/1
37.5 TABLET ORAL
Qty: 31 TABLET | Refills: 3 | Status: SHIPPED | OUTPATIENT
Start: 2018-01-09 | End: 2018-02-13

## 2018-01-09 NOTE — PROGRESS NOTES
Name: Lorena Sears  Seen for f/u of obesity.  HPI:  Lorena Sears is a 47 year old female who presents for the evaluation of obestiy.  Body mass index is 30.51 kg/(m^2).  Wt Readings from Last 2 Encounters:   01/09/18 85.1 kg (187 lb 9.6 oz)   10/03/17 90.3 kg (199 lb)     Growing up was overweight.  20-30 lbs more than normal wt.  H/o early menopause.  No children.  Gradual wt gain over last few years  Likes to cook and eat.  When back from home- tired and not eating healthy.  Snacking at work.  24 hr UFC was normal.  Thyroid labs in normal range.    No DM.  + HTN, on medication. Well controlled.    Started phentermine 37.5 mg/day (10/3/2017)   10/2017-- wt 199 lbs  1/2018-- 187 lbs      Lost 12 lbs  She thinks that phentermine is helping with hunger  Occasionally she gets palpitations and dry mouth.  She would like to continue phentermine at this time.    Menses: early menopause at age 42.  Diarrhea/Constipation:Yes: more constipation  Changes in Hair or Skin:No. Maybe more dry skin  Diabetes:No  Sleep: use Ambien. 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  Hypertension:Yes: on medication  Hyperlipidemia:No  Hirsutism:No  Easy Brusing:Yes: thinks yes  Use of Steroids:No  Family history of Obesity:Yes: brother is obese. other family memerbs overwt  Diet: mostly home food. 2-3 meals a day. Snacks at work. More eating as a part of comfort measure.   Exercise:Yes: gym 3- 5 times a week. 30-50 min cross fit.  PMH/PSH:  No past medical history on file.  Past Surgical History:   Procedure Laterality Date     WRIST SURGERY      cyst removal on right wrist     Family Hx:  Family History   Problem Relation Age of Onset     Hypertension Mother      Thyroid Disease Mother      Hypertension Father      CANCER Father      non Hodgkin lymphoma./waldenstroms     Breast Cancer Maternal Grandmother      older at diagnosis     Alzheimer Disease Maternal Grandfather      Neurologic  "Disorder Maternal Grandfather      parkinsons     CANCER Maternal Grandfather      Alzheimer Disease Paternal Grandmother      CANCER Paternal Grandfather      Breast Cancer Maternal Aunt 50            Social Hx:  Social History     Social History     Marital status: Life Partner     Spouse name: N/A     Number of children: N/A     Years of education: N/A     Occupational History     Not on file.     Social History Main Topics     Smoking status: Former Smoker     Types: Cigarettes     Quit date: 8/1/2011     Smokeless tobacco: Never Used     Alcohol use Yes      Comment: 1 drink a day     Drug use: No     Sexual activity: Yes     Partners: Female     Other Topics Concern     Parent/Sibling W/ Cabg, Mi Or Angioplasty Before 65f 55m? No     Social History Narrative          MEDICATIONS:  has a current medication list which includes the following prescription(s): phentermine, zolpidem, norethindrone-ethinyl estradiol, bupropion, losartan, triamterene-hydrochlorothiazide, cyclobenzaprine, norethindrone-eth estradiol, loratadine, and ibuprofen.    ROS     ROS: 10 point ROS neg other than the symptoms noted above in the HPI.    Physical Exam   VS: /84  Pulse 93  Temp 98.3  F (36.8  C) (Oral)  Ht 1.67 m (5' 5.75\")  Wt 85.1 kg (187 lb 9.6 oz)  SpO2 97%  BMI 30.51 kg/m2  GENERAL: AXOX3, NAD, well dressed, answering questions appropriately, appears stated age.  HEENT: No exopthalmous, no proptosis, EOMI, no lig lag, no retraction  NECK: Thyroid normal in size, non tender, no nodules were palpated.  CV: RRR  LUNGS: CTAB  ABDOMEN: +BS  NEUROLOGY: CN grossly intact, no tremors  PSYCH: normal affect and mood      LABS:  Component      Latest Ref Rng & Units 7/24/2017   Cortisol Free Duration Urine      h 24   Volume      mL 2900   Cortisol ug/g creatinine       26.20   Cortisol Free Urine Random       9.17   Cortisol Free Urine       26.6   Creat/Vol       35   Creat/24hr       1015     Lab Results   Component Value " Date    A1C 4.9 07/17/2017     Lab Results   Component Value Date    TSH 2.66 07/17/2017       Last Basic Metabolic Panel:  Lab Results   Component Value Date     05/24/2017      Lab Results   Component Value Date    POTASSIUM 3.9 05/24/2017     Lab Results   Component Value Date    CHLORIDE 106 05/24/2017     Lab Results   Component Value Date    TIESHA 9.0 05/24/2017     Lab Results   Component Value Date    CO2 22 05/24/2017     Lab Results   Component Value Date    BUN 18 05/24/2017     Lab Results   Component Value Date    CR 1.14 05/24/2017     Lab Results   Component Value Date    GLC 89 05/24/2017       Lab Results   Component Value Date    TSH 3.63 05/19/2016         All pertinent notes, labs, and images personally reviewed by me.     A/P  Ms.Lorena Sears is a 47 year old here for the evaluation of obestiy:    1. Obesity-    Body mass index is 30.51 kg/(m^2).  Obesity is associated with a significant increase in mortality and risk of many disorders, including diabetes mellitus, hypertension, dyslipidemia, heart disease, stroke, sleep apnea, cancer, and many others. Conversely, weight loss is associated with a reduction in obesity-associated morbidity.  Endocrine evaluation of obesity includes Cushing's and thyroid dysfunction.    24 hr UFC was normal.  Thyroid labs in normal range.  Obesity complicated by HTN ( on medications)  No DM, REY.  Off note she is on Bupropion Xl 150 mg.    Plan:  Started phentermine 37.5 mg/day (10/3/2017)   Lost 12 lbs  Plan to continue phentermine   Follow-up in 3 months   The patient is advised to Make better food choices: reduce carbs, Reduce portion size, weight loss and exercise 3-4 times a week.  History of PVC- under control  ECHO -2011 was in normal range  No history of Valvular heart disease.  No family history of CAD.    All side effects including risk for HTN, palpitations, ischemic events, insomnia, CP, dry mouth, risk for valvular heart disease, restlessness  etc.were discussed in detailed. There is also a abuse potential and patient was instructed to use the medication as prescribed.   Discussed indications, risks and benefits of all medications prescribed, and answered questions to patient's satisfaction.  H/o hypertension, well controlled on medication  The patient indicates understanding of these issues and agrees with the plan.    More than 50% of the time spent with Ms. Sears on counseling / coordinating her care.        Follow-up:  3 months    Tiana Anderson MD  Endocrinology   [facility]    CC: Trisha Guerra

## 2018-01-09 NOTE — PATIENT INSTRUCTIONS
[facility]   Dr Anderson, Endocrinology Department      [facility]   [address] [facility] [address]  Appointment [phone]  Fax: [phone]   Monday and Tuesday         [place] [address] [facility] [place] [address]  Appointment [phone]  Fax: [phone]  Wednesday and Thursday           Continue phentermine 37.5 mg/day  The patient is advised to Make better food choices: reduce carbs, Reduce portion size, weight loss and exercise 3-4 times a week.  Follow up in 3 months.

## 2018-01-09 NOTE — NURSING NOTE
"Chief Complaint   Patient presents with     Weight Check     3 mth follow        Initial /84  Pulse 93  Temp 98.3  F (36.8  C) (Oral)  Ht 5' 5.75\" (1.67 m)  Wt 187 lb 9.6 oz (85.1 kg)  SpO2 97%  BMI 30.51 kg/m2 Estimated body mass index is 30.51 kg/(m^2) as calculated from the following:    Height as of this encounter: 5' 5.75\" (1.67 m).    Weight as of this encounter: 187 lb 9.6 oz (85.1 kg).  Medication Reconciliation: complete   Lucila Vallecillo MA// January 9, 2018 9:32 AM          "

## 2018-01-09 NOTE — LETTER
1/9/2018         RE: Lorena Sears  [address] [place] [address]        Dear Colleague,    Thank you for referring your patient, Lorena Sears, to the [facility]. Please see a copy of my visit note below.    Name: Lorena Sears  Seen for f/u of obesity.  HPI:  Lorena Sears is a 47 year old female who presents for the evaluation of obestiy.  Body mass index is 30.51 kg/(m^2).  Wt Readings from Last 2 Encounters:   01/09/18 85.1 kg (187 lb 9.6 oz)   10/03/17 90.3 kg (199 lb)     Growing up was overweight.  20-30 lbs more than normal wt.  H/o early menopause.  No children.  Gradual wt gain over last few years  Likes to cook and eat.  When back from home- tired and not eating healthy.  Snacking at work.  24 hr UFC was normal.  Thyroid labs in normal range.    No DM.  + HTN, on medication. Well controlled.    Started phentermine 37.5 mg/day (10/3/2017)   10/2017-- wt 199 lbs  1/2018-- 187 lbs      Lost 12 lbs  She thinks that phentermine is helping with hunger  Occasionally she gets palpitations and dry mouth.  She would like to continue phentermine at this time.    Menses: early menopause at age 42.  Diarrhea/Constipation:Yes: more constipation  Changes in Hair or Skin:No. Maybe more dry skin  Diabetes:No  Sleep: use Ambien. 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  Hypertension:Yes: on medication  Hyperlipidemia:No  Hirsutism:No  Easy Brusing:Yes: thinks yes  Use of Steroids:No  Family history of Obesity:Yes: brother is obese. other family memerbs overwt  Diet: mostly home food. 2-3 meals a day. Snacks at work. More eating as a part of comfort measure.   Exercise:Yes: gym 3- 5 times a week. 30-50 min cross fit.  PMH/PSH:  No past medical history on file.  Past Surgical History:   Procedure Laterality Date     WRIST SURGERY      cyst removal on right wrist     Family Hx:  Family History   Problem Relation Age of Onset     Hypertension Mother   "    Thyroid Disease Mother      Hypertension Father      CANCER Father      non Hodgkin lymphoma./waldenstroms     Breast Cancer Maternal Grandmother      older at diagnosis     Alzheimer Disease Maternal Grandfather      Neurologic Disorder Maternal Grandfather      parkinsons     CANCER Maternal Grandfather      Alzheimer Disease Paternal Grandmother      CANCER Paternal Grandfather      Breast Cancer Maternal Aunt 50            Social Hx:  Social History     Social History     Marital status: Life Partner     Spouse name: N/A     Number of children: N/A     Years of education: N/A     Occupational History     Not on file.     Social History Main Topics     Smoking status: Former Smoker     Types: Cigarettes     Quit date: 8/1/2011     Smokeless tobacco: Never Used     Alcohol use Yes      Comment: 1 drink a day     Drug use: No     Sexual activity: Yes     Partners: Female     Other Topics Concern     Parent/Sibling W/ Cabg, Mi Or Angioplasty Before 65f 55m? No     Social History Narrative          MEDICATIONS:  has a current medication list which includes the following prescription(s): phentermine, zolpidem, norethindrone-ethinyl estradiol, bupropion, losartan, triamterene-hydrochlorothiazide, cyclobenzaprine, norethindrone-eth estradiol, loratadine, and ibuprofen.    ROS     ROS: 10 point ROS neg other than the symptoms noted above in the HPI.    Physical Exam   VS: /84  Pulse 93  Temp 98.3  F (36.8  C) (Oral)  Ht 1.67 m (5' 5.75\")  Wt 85.1 kg (187 lb 9.6 oz)  SpO2 97%  BMI 30.51 kg/m2  GENERAL: AXOX3, NAD, well dressed, answering questions appropriately, appears stated age.  HEENT: No exopthalmous, no proptosis, EOMI, no lig lag, no retraction  NECK: Thyroid normal in size, non tender, no nodules were palpated.  CV: RRR  LUNGS: CTAB  ABDOMEN: +BS  NEUROLOGY: CN grossly intact, no tremors  PSYCH: normal affect and mood      LABS:  Component      Latest Ref Rng & Units 7/24/2017   Cortisol Free " Duration Urine      h 24   Volume      mL 2900   Cortisol ug/g creatinine       26.20   Cortisol Free Urine Random       9.17   Cortisol Free Urine       26.6   Creat/Vol       35   Creat/24hr       1015     Lab Results   Component Value Date    A1C 4.9 07/17/2017     Lab Results   Component Value Date    TSH 2.66 07/17/2017       Last Basic Metabolic Panel:  Lab Results   Component Value Date     05/24/2017      Lab Results   Component Value Date    POTASSIUM 3.9 05/24/2017     Lab Results   Component Value Date    CHLORIDE 106 05/24/2017     Lab Results   Component Value Date    TIESHA 9.0 05/24/2017     Lab Results   Component Value Date    CO2 22 05/24/2017     Lab Results   Component Value Date    BUN 18 05/24/2017     Lab Results   Component Value Date    CR 1.14 05/24/2017     Lab Results   Component Value Date    GLC 89 05/24/2017       Lab Results   Component Value Date    TSH 3.63 05/19/2016         All pertinent notes, labs, and images personally reviewed by me.     A/P  Ms.Lori DOE Sears is a 47 year old here for the evaluation of obestiy:    1. Obesity-    Body mass index is 30.51 kg/(m^2).  Obesity is associated with a significant increase in mortality and risk of many disorders, including diabetes mellitus, hypertension, dyslipidemia, heart disease, stroke, sleep apnea, cancer, and many others. Conversely, weight loss is associated with a reduction in obesity-associated morbidity.  Endocrine evaluation of obesity includes Cushing's and thyroid dysfunction.    24 hr UFC was normal.  Thyroid labs in normal range.  Obesity complicated by HTN ( on medications)  No DM, REY.  Off note she is on Bupropion Xl 150 mg.    Plan:  Started phentermine 37.5 mg/day (10/3/2017)   Lost 12 lbs  Plan to continue phentermine   Follow-up in 3 months   The patient is advised to Make better food choices: reduce carbs, Reduce portion size, weight loss and exercise 3-4 times a week.  History of PVC- under control  ECHO  -2011 was in normal range  No history of Valvular heart disease.  No family history of CAD.    All side effects including risk for HTN, palpitations, ischemic events, insomnia, CP, dry mouth, risk for valvular heart disease, restlessness etc.were discussed in detailed. There is also a abuse potential and patient was instructed to use the medication as prescribed.   Discussed indications, risks and benefits of all medications prescribed, and answered questions to patient's satisfaction.  H/o hypertension, well controlled on medication  The patient indicates understanding of these issues and agrees with the plan.    More than 50% of the time spent with Ms. Sears on counseling / coordinating her care.        Follow-up:  3 months    Tiana Anderson MD  Endocrinology   [facility]    CC: Trisha Guerra        Again, thank you for allowing me to participate in the care of your patient.        Sincerely,        Tiana Anderson MD

## 2018-01-09 NOTE — MR AVS SNAPSHOT
After Visit Summary   2018    Lorena Sears    MRN: [id number]           Patient Information     Date Of Birth          1970        Visit Information        Provider Department      2018 9:30 AM Tiana Anderson MD [facility]        Today's Diagnoses     Non morbid obesity, unspecified obesity type          Care Instructions    [facility]   Dr Anderson, Endocrinology Department      [facility]   [address] [address]  Appointment [phone]  Fax: [phone]   Monday and Tuesday         [facility]   [address] [facility] [place] [address]  Appointment [phone]  Fax: [phone]  Wednesday and Thursday           Continue phentermine 37.5 mg/day  The patient is advised to Make better food choices: reduce carbs, Reduce portion size, weight loss and exercise 3-4 times a week.  Follow up in 3 months.          Follow-ups after your visit        Who to contact     If you have questions or need follow up information about today's clinic visit or your schedule please contact [facility] directly at [phone].  Normal or non-critical lab and imaging results will be communicated to you by MyChart, letter or phone within 4 business days after the clinic has received the results. If you do not hear from us within 7 days, please contact the clinic through Swiftohart or phone. If you have a critical or abnormal lab result, we will notify you by phone as soon as possible.  Submit refill requests through [facility] or call your pharmacy and they will forward the refill request to us. Please allow 3 business days for your refill to be completed.          Additional Information About Your Visit        MyChart Information     [facility] gives you secure access to your electronic health record. If you see a primary care provider, you can also send  "messages to your care team and make appointments. If you have questions, please call your primary care clinic.  If you do not have a primary care provider, please call [phone] and they will assist you.        Care EveryWhere ID     This is your Care EveryWhere ID. This could be used by other organizations to access your [place] medical records  [id number]        Your Vitals Were     Pulse Temperature Height Pulse Oximetry BMI (Body Mass Index)       93 98.3  F (36.8  C) (Oral) 1.67 m (5' 5.75\") 97% 30.51 kg/m2        Blood Pressure from Last 3 Encounters:   01/09/18 110/84   10/03/17 122/88   07/17/17 122/84    Weight from Last 3 Encounters:   01/09/18 85.1 kg (187 lb 9.6 oz)   10/03/17 90.3 kg (199 lb)   07/17/17 88 kg (194 lb 1.6 oz)              Today, you had the following     No orders found for display         Today's Medication Changes          These changes are accurate as of: 1/9/18  9:41 AM.  If you have any questions, ask your nurse or doctor.               These medicines have changed or have updated prescriptions.        Dose/Directions    buPROPion 150 MG 24 hr tablet   Commonly known as:  WELLBUTRIN XL   This may have changed:  Another medication with the same name was removed. Continue taking this medication, and follow the directions you see here.   Used for:  Moderate recurrent major depression (H)   Changed by:  Trisha Guerra MD        Take 1 tablet (150 mg) by mouth daily every morning.   Quantity:  90 tablet   Refills:  3            Where to get your medicines      Some of these will need a paper prescription and others can be bought over the counter.  Ask your nurse if you have questions.     Bring a paper prescription for each of these medications     phentermine 37.5 MG tablet                Primary Care Provider Office Phone # Fax #    Trisha Guerra [phone] [address] [place] [address]        Equal Access to Services  "    KARYN LYON : Hadii aad ku nia [place], waaxda luqadaha, qaybta kaalmada lisa, harriet dimitri karlieandreia love teacandida littlejohn . So [facility] [phone].    ATENCIÓN: Si habla silvio, tiene a alfred disposición servicios gratuitos de asistencia lingüística. Keviname al [phone].    We comply with applicable federal civil rights laws and [place] laws. We do not discriminate on the basis of race, color, national origin, age, disability, sex, sexual orientation, or gender identity.            Thank you!     Thank you for choosing [facility] [place]  for your care. Our goal is always to provide you with excellent care. Hearing back from our patients is one way we can continue to improve our services. Please take a few minutes to complete the written survey that you may receive in the mail after your visit with us. Thank you!             Your Updated Medication List - Protect others around you: Learn how to safely use, store and throw away your medicines at www.disposemymeds.org.          This list is accurate as of: 1/9/18  9:41 AM.  Always use your most recent med list.                   Brand Name Dispense Instructions for use Diagnosis    buPROPion 150 MG 24 hr tablet    WELLBUTRIN XL    90 tablet    Take 1 tablet (150 mg) by mouth daily every morning.    Moderate recurrent major depression (H)       cyclobenzaprine 10 MG tablet    FLEXERIL    90 tablet    Take 1 tablet (10 mg) by mouth nightly as needed for muscle spasms    DDD (degenerative disc disease), lumbar       ibuprofen 200 MG tablet    ADVIL/MOTRIN     Take 200 mg by mouth every 4 hours as needed.        loratadine 10 MG tablet    CLARITIN    90 tablet    Take 1 tablet (10 mg) by mouth daily    Environmental allergies       losartan 50 MG tablet    COZAAR    90 tablet    Take 1 tablet (50 mg) by mouth daily    Benign hypertension       * norethindrone-eth estradiol 0.5-2.5 MG-MCG Tabs     30 tablet    Take 1 tablet by mouth daily    Post-menopause  on HRT (hormone replacement therapy)       * norethindrone-ethinyl estradiol 1-5 MG-MCG per tablet    FEMHRT 1/5    90 tablet    Take 1 tablet by mouth daily    Menopause       phentermine 37.5 MG tablet    ADIPEX-P    31 tablet    Take 1 tablet (37.5 mg) by mouth every morning (before breakfast)    Non morbid obesity, unspecified obesity type       triamterene-hydrochlorothiazide 37.5-25 MG per tablet    MAXZIDE-25    90 tablet    Take 1 tablet by mouth daily    Active cochlear Meniere's disease, right       zolpidem 10 MG tablet    AMBIEN    30 tablet    Take 1 tablet (10 mg) by mouth nightly as needed for sleep    Primary insomnia       * Notice:  This list has 2 medication(s) that are the same as other medications prescribed for you. Read the directions carefully, and ask your doctor or other care provider to review them with you.

## 2018-01-25 ENCOUNTER — TELEPHONE (OUTPATIENT)
Dept: PEDIATRICS | Facility: CLINIC | Age: 48
End: 2018-01-25

## 2018-01-25 ENCOUNTER — E-VISIT (OUTPATIENT)
Dept: PEDIATRICS | Facility: CLINIC | Age: 48
End: 2018-01-25
Payer: COMMERCIAL

## 2018-01-25 DIAGNOSIS — G47.00 INSOMNIA, UNSPECIFIED TYPE: Primary | ICD-10-CM

## 2018-01-25 PROCEDURE — 99444 ZZC PHYSICIAN ONLINE EVALUATION & MANAGEMENT SERVICE: CPT | Performed by: INTERNAL MEDICINE

## 2018-01-25 NOTE — TELEPHONE ENCOUNTER
Patient called back to clarify Miladis's message.  Patient works for [employer] and is under a lot of stress, will be stressed for the next 10 days, has been sleeping only 2-3 hours/night with Ambien (Ambien usually works well for her), requesting additional or alternate sleep aid for the time being. n   Advised e-visit.  Patient agrees to send e-visit request.    Millie Shoemaker, Triage RN

## 2018-01-25 NOTE — TELEPHONE ENCOUNTER
Patient left VM message that the Ambien is not working.  Is able to fall asleep, but then awakens in 2-3 hrs and is not able to fall back to sleep the remainder of the night.  Increased stressors at work and these will continue for the next two weeks.  Requesting another medication to help with sleep.    LM for patient that she would need an appointment to discuss medication management-either office appointment or Evisit.  ZOIE Ravi RN

## 2018-01-31 DIAGNOSIS — E66.9 NON MORBID OBESITY, UNSPECIFIED OBESITY TYPE: ICD-10-CM

## 2018-01-31 NOTE — TELEPHONE ENCOUNTER
Duplicate.     phentermine (ADIPEX-P) 37.5 MG tablet was filled on 1/9/2018, qty 31 with 3 refills.

## 2018-02-01 RX ORDER — PHENTERMINE HYDROCHLORIDE 37.5 MG/1
37.5 TABLET ORAL
Qty: 31 TABLET | Refills: 3 | OUTPATIENT
Start: 2018-02-01

## 2018-02-08 DIAGNOSIS — E66.9 NON MORBID OBESITY, UNSPECIFIED OBESITY TYPE: ICD-10-CM

## 2018-02-09 RX ORDER — PHENTERMINE HYDROCHLORIDE 37.5 MG/1
37.5 TABLET ORAL
Qty: 31 TABLET | Refills: 3 | OUTPATIENT
Start: 2018-02-09

## 2018-02-09 NOTE — TELEPHONE ENCOUNTER
Requested Prescriptions   Pending Prescriptions Disp Refills     phentermine (ADIPEX-P) 37.5 MG tablet  Last Written Prescription Date:  01/09/2018  Last Fill Quantity: 31 tablet,  # refills: 3   Last Office Visit with [facility] provider:  01/09/2018   Future Office Visit:    Next 5 appointments (look out 90 days)     Apr 03, 2018 11:00 AM CDT   Return Visit with Tiana Anderson MD   [facility]    [address] [place] [address]  [address]  [facility] [id number] [phone]                  31 tablet 3     Sig: Take 1 tablet (37.5 mg) by mouth every morning (before breakfast)    There is no refill protocol information for this order     Next 5 appointments (look out 90 days)     Apr 03, 2018 11:00 AM CDT   Return Visit with Tiana Anderson MD   [facility]    [address] [place] [address]  [address]  [facility] [id number] [phone]                   Routing refill request to provider for review/approval because:  Drug not on the G, P or [facility] refill protocol or controlled substance

## 2018-02-09 NOTE — TELEPHONE ENCOUNTER
Duplicate. Script was sent on 1/9/18 for 31 tabs with 3 refills.   Denial sent to pharmacy with note requesting they refill.     Aleena Pierce RN -- [facility]

## 2018-02-12 ENCOUNTER — MYC MEDICAL ADVICE (OUTPATIENT)
Dept: ENDOCRINOLOGY | Facility: CLINIC | Age: 48
End: 2018-02-12

## 2018-02-12 DIAGNOSIS — E66.9 NON MORBID OBESITY, UNSPECIFIED OBESITY TYPE: ICD-10-CM

## 2018-02-12 NOTE — TELEPHONE ENCOUNTER
Last visit was 1/9/18 and prescription was given at that time.  Which means that she does not have any prescription in hand at this time  Please pend the orders with correct quantity, select pharmacy and then send for signature. Also associate with correct diagnosis.    Thank you.    Tiana Anderson

## 2018-02-12 NOTE — TELEPHONE ENCOUNTER
Can u check with pharmacy when was the last time she filled it up?>  It is a controlled substanace and need to be careful.  After that is confirmed I can send a new Rx.

## 2018-02-13 RX ORDER — PHENTERMINE HYDROCHLORIDE 37.5 MG/1
37.5 TABLET ORAL
Qty: 31 TABLET | Refills: 3 | Status: SHIPPED | OUTPATIENT
Start: 2018-02-13 | End: 2018-04-03 | Stop reason: DRUGHIGH

## 2018-04-03 ENCOUNTER — RADIANT APPOINTMENT (OUTPATIENT)
Dept: MAMMOGRAPHY | Facility: CLINIC | Age: 48
End: 2018-04-03
Attending: INTERNAL MEDICINE
Payer: COMMERCIAL

## 2018-04-03 ENCOUNTER — OFFICE VISIT (OUTPATIENT)
Dept: ENDOCRINOLOGY | Facility: CLINIC | Age: 48
End: 2018-04-03
Payer: COMMERCIAL

## 2018-04-03 VITALS
WEIGHT: 186.6 LBS | SYSTOLIC BLOOD PRESSURE: 132 MMHG | DIASTOLIC BLOOD PRESSURE: 80 MMHG | RESPIRATION RATE: 12 BRPM | OXYGEN SATURATION: 99 % | BODY MASS INDEX: 29.99 KG/M2 | HEART RATE: 83 BPM | HEIGHT: 66 IN

## 2018-04-03 DIAGNOSIS — E66.9 NON MORBID OBESITY, UNSPECIFIED OBESITY TYPE: Primary | ICD-10-CM

## 2018-04-03 DIAGNOSIS — Z12.31 VISIT FOR SCREENING MAMMOGRAM: ICD-10-CM

## 2018-04-03 PROCEDURE — 99214 OFFICE O/P EST MOD 30 MIN: CPT | Performed by: INTERNAL MEDICINE

## 2018-04-03 PROCEDURE — 77067 SCR MAMMO BI INCL CAD: CPT | Mod: TC

## 2018-04-03 RX ORDER — PHENTERMINE HYDROCHLORIDE 15 MG/1
15 CAPSULE ORAL EVERY MORNING
Qty: 31 CAPSULE | Refills: 1 | Status: SHIPPED | OUTPATIENT
Start: 2018-04-03 | End: 2018-07-26

## 2018-04-03 RX ORDER — TOPIRAMATE 25 MG/1
100 TABLET, FILM COATED ORAL 2 TIMES DAILY
Qty: 120 TABLET | Refills: 3 | Status: SHIPPED | OUTPATIENT
Start: 2018-04-03 | End: 2018-07-26

## 2018-04-03 NOTE — MR AVS SNAPSHOT
After Visit Summary   4/3/2018    Lorena Sears    MRN: [id number]           Patient Information     Date Of Birth          1970        Visit Information        Provider Department      4/3/2018 11:00 AM Tiana Anderson MD [facility]        Today's Diagnoses     Non morbid obesity, unspecified obesity type    -  1      Care Instructions    [facility]   Dr Anderson, Endocrinology Department      [facility]   [address] [address]  Appointment [phone]  Fax: [phone]   Monday and Tuesday         [facility]   [address] [facility] [place] [address]  Appointment [phone]  Fax: [phone]  Wednesday and Thursday           Decrease phentermine to 15 mg/day  Star topamax  Start topiramate 25 mgday and titrate to 100 mg/day gradually. Discussed possible s/e of topiramate including paresthesias, blood in urine, somnolence, and difficulty concentrating. Topiramate has also been associated with metabolic acidosis and kidney stones.  The patient is advised to Make better food choices: reduce carbs, Reduce portion size, weight loss and exercise 3-4 times a week.    Follow up in 3 months                Follow-ups after your visit        Your next 10 appointments already scheduled     2018 11:45 AM CDT   (Arrive by 11:30 AM)   MA SCREENING DIGITAL BILATERAL with EAMA1   [facility] ([facility]    [address] [place] [address],[address]  [facility] [address] [phone]           Do not use any powder, lotion or deodorant under your arms or on your breast. If you do, we will ask you to remove it before your exam.  Wear comfortable, two-piece clothing.  If you have any allergies, tell your care team.  Bring any previous mammograms from other facilities or have them mailed to the breast center. Three-dimensional (3D)  "mammograms are available at [facility] locations in [facility], [place], [place], [facility], [place], [place], and [place]. [facility] locations include [place] and [facility] in [place]. Benefits of 3D mammograms include: - Improved rate of cancer detection - Decreases your chance of having to go back for more tests, which means fewer: - \"False-positive\" results (This means that there is an abnormal area but it isn't cancer.) - Invasive testing procedures, such as a biopsy or surgery - Can provide clearer images of the breast if you have dense breast tissue. 3D mammography is an optional exam that anyone can have with a 2D mammogram. It doesn't replace or take the place of a 2D mammogram. 2D mammograms remain an effective screening test for all women.  Not all insurance companies cover the cost of a 3D mammogram. Check with your insurance.              Who to contact     If you have questions or need follow up information about today's clinic visit or your schedule please contact [facility] directly at [phone].  Normal or non-critical lab and imaging results will be communicated to you by ClearRiskhart, letter or phone within 4 business days after the clinic has received the results. If you do not hear from us within 7 days, please contact the clinic through [employer] or phone. If you have a critical or abnormal lab result, we will notify you by phone as soon as possible.  Submit refill requests through [employer] or call your pharmacy and they will forward the refill request to us. Please allow 3 business days for your refill to be completed.          Additional Information About Your Visit        [employer] Information     [employer] gives you secure access to your electronic health record. If you see a primary care provider, you can also send messages to your care team and make appointments. If you have questions, please call your primary care clinic.  If you do not have a " "primary care provider, please call [phone] and they will assist you.        Care EveryWhere ID     This is your Care EveryWhere ID. This could be used by other organizations to access your [place] medical records  [id number]        Your Vitals Were     Pulse Respirations Height Pulse Oximetry BMI (Body Mass Index)       83 12 1.683 m (5' 6.25\") 99% 29.89 kg/m2        Blood Pressure from Last 3 Encounters:   04/03/18 132/80   01/09/18 110/84   10/03/17 122/88    Weight from Last 3 Encounters:   04/03/18 84.6 kg (186 lb 9.6 oz)   01/09/18 85.1 kg (187 lb 9.6 oz)   10/03/17 90.3 kg (199 lb)              Today, you had the following     No orders found for display         Today's Medication Changes          These changes are accurate as of 4/3/18 11:39 AM.  If you have any questions, ask your nurse or doctor.               Start taking these medicines.        Dose/Directions    phentermine 15 MG capsule   Used for:  Non morbid obesity, unspecified obesity type   Replaces:  phentermine 37.5 MG tablet   Started by:  Tiana Anderson MD        Dose:  15 mg   Take 1 capsule (15 mg) by mouth every morning   Quantity:  31 capsule   Refills:  1       topiramate 25 MG tablet   Commonly known as:  TOPAMAX   Used for:  Non morbid obesity, unspecified obesity type   Started by:  Tiana Anderson MD        Dose:  100 mg   Take 4 tablets (100 mg) by mouth 2 times daily   Quantity:  120 tablet   Refills:  3         These medicines have changed or have updated prescriptions.        Dose/Directions    norethindrone-ethinyl estradiol 1-5 MG-MCG per tablet   Commonly known as:  FEMHRT 1/5   This may have changed:  Another medication with the same name was removed. Continue taking this medication, and follow the directions you see here.   Used for:  Menopause   Changed by:  Tiana Anderson MD        Dose:  1 tablet   Take 1 tablet by mouth daily   Quantity:  90 tablet   Refills:  3         Stop taking these " medicines if you haven't already. Please contact your care team if you have questions.     loratadine 10 MG tablet   Commonly known as:  CLARITIN   Stopped by:  Tiana Anderson MD           phentermine 37.5 MG tablet   Commonly known as:  ADIPEX-P   Replaced by:  phentermine 15 MG capsule   Stopped by:  Tiana Anderson MD           triamterene-hydrochlorothiazide 37.5-25 MG per tablet   Commonly known as:  MAXZIDE-25   Stopped by:  Tiana Anderson MD                Where to get your medicines      These medications were sent to [id number] - [place], [place] - [address]  [address], [place] [address]     Phone:  [phone]     topiramate 25 MG tablet         Some of these will need a paper prescription and others can be bought over the counter.  Ask your nurse if you have questions.     Bring a paper prescription for each of these medications     phentermine 15 MG capsule                Primary Care Provider Office Phone # Fax #    Trisha Guerra [phone] [address] [place] [address]        Equal Access to Services     [facility]: Hadii aad ku hadasho Soomaali, waaxda luqadaha, qaybta kaalmada adeegyada, harriet doe. So [facility] [phone].    ATENCIÓN: Si habla español, tiene a alfred disposición servicios gratuitos de asistencia lingüística. Ll[facility] [phone].    We comply with applicable federal civil rights laws and [place] laws. We do not discriminate on the basis of race, color, national origin, age, disability, sex, sexual orientation, or gender identity.            Thank you!     Thank you for choosing [facility] [place]  for your care. Our goal is always to provide you with excellent care. Hearing back from our patients is one way we can continue to improve our services. Please take a few minutes to complete the written survey that  you may receive in the mail after your visit with us. Thank you!             Your Updated Medication List - Protect others around you: Learn how to safely use, store and throw away your medicines at www.disposemymeds.org.          This list is accurate as of 4/3/18 11:39 AM.  Always use your most recent med list.                   Brand Name Dispense Instructions for use Diagnosis    buPROPion 150 MG 24 hr tablet    WELLBUTRIN XL    90 tablet    Take 1 tablet (150 mg) by mouth daily every morning.    Moderate recurrent major depression (H)       cyclobenzaprine 10 MG tablet    FLEXERIL    90 tablet    Take 1 tablet (10 mg) by mouth nightly as needed for muscle spasms    DDD (degenerative disc disease), lumbar       ibuprofen 200 MG tablet    ADVIL/MOTRIN     Take 200 mg by mouth every 4 hours as needed.        losartan 50 MG tablet    COZAAR    90 tablet    Take 1 tablet (50 mg) by mouth daily    Benign hypertension       norethindrone-ethinyl estradiol 1-5 MG-MCG per tablet    FEMHRT 1/5    90 tablet    Take 1 tablet by mouth daily    Menopause       phentermine 15 MG capsule     31 capsule    Take 1 capsule (15 mg) by mouth every morning    Non morbid obesity, unspecified obesity type       topiramate 25 MG tablet    TOPAMAX    120 tablet    Take 4 tablets (100 mg) by mouth 2 times daily    Non morbid obesity, unspecified obesity type       zolpidem 10 MG tablet    AMBIEN    30 tablet    Take 1 tablet (10 mg) by mouth nightly as needed for sleep    Primary insomnia

## 2018-04-03 NOTE — PROGRESS NOTES
Name: Lorena Sears  Seen for f/u of obesity.  HPI:  Lorena Sears is a 47 year old female who presents for the evaluation of obestiy.  Body mass index is 29.89 kg/(m^2).  Wt Readings from Last 2 Encounters:   04/03/18 84.6 kg (186 lb 9.6 oz)   01/09/18 85.1 kg (187 lb 9.6 oz)     Growing up was overweight.  20-30 lbs more than normal wt.  H/o early menopause.  No children.  Gradual wt gain over last few years  Likes to cook and eat.  When back from home- tired and not eating healthy.  Snacking at work.  24 hr UFC was normal.  Thyroid labs in normal range.    No DM.  + HTN, on medication. Well controlled.    Started phentermine 37.5 mg/day (10/3/2017)   10/2017-- wt 199 lbs  1/2018-- 187 lbs  4/2018-- 186 lbs    Blood pressure and heart rate is okay.  But she is complaining of chest pain and insomnia when she is on phentermine.  Previously she was having trouble with phentermine but was able to go to sleep with Ambien but now Ambien is not working.  Work was very stressful over the Super Bowl timing.  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  She thinks that phentermine is helping with hunger    Menses: early menopause at age 42.  Diarrhea/Constipation:Yes: more constipation  Changes in Hair or Skin:No. Maybe more dry skin  Diabetes:No  Sleep: use Ambien. 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  Hypertension:Yes: on medication  Hyperlipidemia:No  Hirsutism:No  Easy Brusing:Yes: thinks yes  Use of Steroids:No  Family history of Obesity:Yes: brother is obese. other family memerbs overwt  Diet: mostly home food. 2-3 meals a day. Snacks at work. More eating as a part of comfort measure.   Exercise:Yes: gym 3- 5 times a week. 30-50 min cross fit.  PMH/PSH:  History reviewed. No pertinent past medical history.  Past Surgical History:   Procedure Laterality Date     WRIST SURGERY      cyst removal on right wrist     Family Hx:  Family History  "  Problem Relation Age of Onset     Hypertension Mother      Thyroid Disease Mother      Hypertension Father      CANCER Father      non Hodgkin lymphoma./waldenstroms     Breast Cancer Maternal Grandmother      older at diagnosis     Alzheimer Disease Maternal Grandfather      Neurologic Disorder Maternal Grandfather      parkinsons     CANCER Maternal Grandfather      Alzheimer Disease Paternal Grandmother      CANCER Paternal Grandfather      Breast Cancer Maternal Aunt 50            Social Hx:  Social History     Social History     Marital status: Life Partner     Spouse name: N/A     Number of children: N/A     Years of education: N/A     Occupational History     Not on file.     Social History Main Topics     Smoking status: Former Smoker     Types: Cigarettes     Quit date: 8/1/2011     Smokeless tobacco: Never Used     Alcohol use Yes      Comment: 1 drink a day     Drug use: No     Sexual activity: Yes     Partners: Female     Other Topics Concern     Parent/Sibling W/ Cabg, Mi Or Angioplasty Before 65f 55m? No     Social History Narrative          MEDICATIONS:  has a current medication list which includes the following prescription(s): phentermine, topiramate, zolpidem, norethindrone-ethinyl estradiol, bupropion, losartan, cyclobenzaprine, and ibuprofen.    ROS     ROS: 10 point ROS neg other than the symptoms noted above in the HPI.    Physical Exam   VS: /80 (BP Location: Right arm, Patient Position: Chair, Cuff Size: Adult Regular)  Pulse 83  Resp 12  Ht 1.683 m (5' 6.25\")  Wt 84.6 kg (186 lb 9.6 oz)  SpO2 99%  BMI 29.89 kg/m2  GENERAL: AXOX3, NAD, well dressed, answering questions appropriately, appears stated age.  HEENT: No exopthalmous, no proptosis, EOMI, no lig lag, no retraction  NECK: Thyroid normal in size, non tender, no nodules were palpated.  CV: RRR  LUNGS: CTAB  ABDOMEN: +BS  NEUROLOGY: CN grossly intact, no tremors  PSYCH: normal affect and mood      LABS:  Component      " Latest Ref Rng & Units 7/24/2017   Cortisol Free Duration Urine      h 24   Volume      mL 2900   Cortisol ug/g creatinine       26.20   Cortisol Free Urine Random       9.17   Cortisol Free Urine       26.6   Creat/Vol       35   Creat/24hr       1015     Lab Results   Component Value Date    A1C 4.9 07/17/2017     Lab Results   Component Value Date    TSH 2.66 07/17/2017       Last Basic Metabolic Panel:  Lab Results   Component Value Date     05/24/2017      Lab Results   Component Value Date    POTASSIUM 3.9 05/24/2017     Lab Results   Component Value Date    CHLORIDE 106 05/24/2017     Lab Results   Component Value Date    TIESHA 9.0 05/24/2017     Lab Results   Component Value Date    CO2 22 05/24/2017     Lab Results   Component Value Date    BUN 18 05/24/2017     Lab Results   Component Value Date    CR 1.14 05/24/2017     Lab Results   Component Value Date    GLC 89 05/24/2017       Lab Results   Component Value Date    TSH 3.63 05/19/2016         All pertinent notes, labs, and images personally reviewed by me.     A/P  Ms.Lori DOE Sears is a 47 year old here for the evaluation of obestiy:    1. Obesity-    Body mass index is 29.89 kg/(m^2).  Obesity is associated with a significant increase in mortality and risk of many disorders, including diabetes mellitus, hypertension, dyslipidemia, heart disease, stroke, sleep apnea, cancer, and many others. Conversely, weight loss is associated with a reduction in obesity-associated morbidity.  Endocrine evaluation of obesity includes Cushing's and thyroid dysfunction.    24 hr UFC was normal.  Thyroid labs in normal range.  Obesity complicated by HTN ( on medications)  No DM, REY.  Off note she is on Bupropion Xl 150 mg.    Plan:  Started phentermine 37.5 mg/day (10/3/2017)   Lost 12 lbs on it.  No major weight loss in last 3 months.  Blood pressure and heart rate is normal but she is complaining of dry mouth and insomnia while on phentermine.  Ambien is not  working now.  I discussed that now her weight has plateaued we might consider stopping phentermine.  I discussed combination pill options including Qysemia as well as discussed Topamax.  Plan:  Decrease phentermine to 15 mg  Start Topamax at 25 mg per day and gradually increase up to 100 mg per day  Follow-up in 3 months   The patient is advised to Make better food choices: reduce carbs, Reduce portion size, weight loss and exercise 3-4 times a week.  History of PVC- under control  ECHO -2011 was in normal range  No history of Valvular heart disease.  No family history of CAD.    Topamax possible s/e- side effects:   Discussed possible s/e of topiramate including paresthesias, blood in urine, somnolence, and difficulty concentrating. Topiramate has also been associated with metabolic acidosis and kidney stones.  Also risk of risk of narrow-angle glaucoma causing eye pain or vision loss, slowing down of thinking, decreased sweating and nausea.    All side effects of phentermine  including risk for HTN, palpitations, ischemic events, insomnia, CP, dry mouth, risk for valvular heart disease, restlessness etc.were discussed in detailed. There is also a abuse potential and patient was instructed to use the medication as prescribed.   Discussed indications, risks and benefits of all medications prescribed, and answered questions to patient's satisfaction.  H/o hypertension, well controlled on medication  The patient indicates understanding of these issues and agrees with the plan.    More than 50% of the time spent with Ms. Sears on counseling / coordinating her care.        Follow-up:  3 months    Tiana Anderson MD  Endocrinology   [facility]/[place]    CC: Trisha Guerra

## 2018-04-03 NOTE — PATIENT INSTRUCTIONS
[facility]   Dr Anderson, Endocrinology Department      [facility]   [address] [address]  Appointment [phone]  Fax: [phone]   Monday and Tuesday         [facility]   [address] [place] [place] [address]  Appointment [phone]  Fax: [phone]  Wednesday and Thursday           Decrease phentermine to 15 mg/day  Star topamax  Start topiramate 25 mgday and titrate to 100 mg/day gradually. Discussed possible s/e of topiramate including paresthesias, blood in urine, somnolence, and difficulty concentrating. Topiramate has also been associated with metabolic acidosis and kidney stones.  The patient is advised to Make better food choices: reduce carbs, Reduce portion size, weight loss and exercise 3-4 times a week.    Follow up in 3 months

## 2018-04-03 NOTE — LETTER
4/3/2018         RE: Lorena Sears  [address] [place] [address]        Dear Colleague,    Thank you for referring your patient, Lorena Sears, to the [facility]. Please see a copy of my visit note below.    Name: Lorena Sears  Seen for f/u of obesity.  HPI:  Lorena Sears is a 47 year old female who presents for the evaluation of obestiy.  Body mass index is 29.89 kg/(m^2).  Wt Readings from Last 2 Encounters:   04/03/18 84.6 kg (186 lb 9.6 oz)   01/09/18 85.1 kg (187 lb 9.6 oz)     Growing up was overweight.  20-30 lbs more than normal wt.  H/o early menopause.  No children.  Gradual wt gain over last few years  Likes to cook and eat.  When back from home- tired and not eating healthy.  Snacking at work.  24 hr UFC was normal.  Thyroid labs in normal range.    No DM.  + HTN, on medication. Well controlled.    Started phentermine 37.5 mg/day (10/3/2017)   10/2017-- wt 199 lbs  1/2018-- 187 lbs  4/2018-- 186 lbs    Blood pressure and heart rate is okay.  But she is complaining of chest pain and insomnia when she is on phentermine.  Previously she was having trouble with phentermine but was able to go to sleep with Ambien but now Ambien is not working.  Work was very stressful over the Super Bowl timing.  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  She thinks that phentermine is helping with hunger    Menses: early menopause at age 42.  Diarrhea/Constipation:Yes: more constipation  Changes in Hair or Skin:No. Maybe more dry skin  Diabetes:No  Sleep: use Ambien. 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  Hypertension:Yes: on medication  Hyperlipidemia:No  Hirsutism:No  Easy Brusing:Yes: thinks yes  Use of Steroids:No  Family history of Obesity:Yes: brother is obese. other family memerbs overwt  Diet: mostly home food. 2-3 meals a day. Snacks at work. More eating as a part of comfort measure.   Exercise:Yes: gym 3- 5  "times a week. 30-50 min cross fit.  PMH/PSH:  History reviewed. No pertinent past medical history.  Past Surgical History:   Procedure Laterality Date     WRIST SURGERY      cyst removal on right wrist     Family Hx:  Family History   Problem Relation Age of Onset     Hypertension Mother      Thyroid Disease Mother      Hypertension Father      CANCER Father      non Hodgkin lymphoma./waldenstroms     Breast Cancer Maternal Grandmother      older at diagnosis     Alzheimer Disease Maternal Grandfather      Neurologic Disorder Maternal Grandfather      parkinsons     CANCER Maternal Grandfather      Alzheimer Disease Paternal Grandmother      CANCER Paternal Grandfather      Breast Cancer Maternal Aunt 50            Social Hx:  Social History     Social History     Marital status: Life Partner     Spouse name: N/A     Number of children: N/A     Years of education: N/A     Occupational History     Not on file.     Social History Main Topics     Smoking status: Former Smoker     Types: Cigarettes     Quit date: 8/1/2011     Smokeless tobacco: Never Used     Alcohol use Yes      Comment: 1 drink a day     Drug use: No     Sexual activity: Yes     Partners: Female     Other Topics Concern     Parent/Sibling W/ Cabg, Mi Or Angioplasty Before 65f 55m? No     Social History Narrative          MEDICATIONS:  has a current medication list which includes the following prescription(s): phentermine, topiramate, zolpidem, norethindrone-ethinyl estradiol, bupropion, losartan, cyclobenzaprine, and ibuprofen.    ROS     ROS: 10 point ROS neg other than the symptoms noted above in the HPI.    Physical Exam   VS: /80 (BP Location: Right arm, Patient Position: Chair, Cuff Size: Adult Regular)  Pulse 83  Resp 12  Ht 1.683 m (5' 6.25\")  Wt 84.6 kg (186 lb 9.6 oz)  SpO2 99%  BMI 29.89 kg/m2  GENERAL: AXOX3, NAD, well dressed, answering questions appropriately, appears stated age.  HEENT: No exopthalmous, no proptosis, EOMI, no " lig lag, no retraction  NECK: Thyroid normal in size, non tender, no nodules were palpated.  CV: RRR  LUNGS: CTAB  ABDOMEN: +BS  NEUROLOGY: CN grossly intact, no tremors  PSYCH: normal affect and mood      LABS:  Component      Latest Ref Rng & Units 7/24/2017   Cortisol Free Duration Urine      h 24   Volume      mL 2900   Cortisol ug/g creatinine       26.20   Cortisol Free Urine Random       9.17   Cortisol Free Urine       26.6   Creat/Vol       35   Creat/24hr       1015     Lab Results   Component Value Date    A1C 4.9 07/17/2017     Lab Results   Component Value Date    TSH 2.66 07/17/2017       Last Basic Metabolic Panel:  Lab Results   Component Value Date     05/24/2017      Lab Results   Component Value Date    POTASSIUM 3.9 05/24/2017     Lab Results   Component Value Date    CHLORIDE 106 05/24/2017     Lab Results   Component Value Date    TIESHA 9.0 05/24/2017     Lab Results   Component Value Date    CO2 22 05/24/2017     Lab Results   Component Value Date    BUN 18 05/24/2017     Lab Results   Component Value Date    CR 1.14 05/24/2017     Lab Results   Component Value Date    GLC 89 05/24/2017       Lab Results   Component Value Date    TSH 3.63 05/19/2016         All pertinent notes, labs, and images personally reviewed by me.     A/P  Ms.Lori DOE Sears is a 47 year old here for the evaluation of obestiy:    1. Obesity-    Body mass index is 29.89 kg/(m^2).  Obesity is associated with a significant increase in mortality and risk of many disorders, including diabetes mellitus, hypertension, dyslipidemia, heart disease, stroke, sleep apnea, cancer, and many others. Conversely, weight loss is associated with a reduction in obesity-associated morbidity.  Endocrine evaluation of obesity includes Cushing's and thyroid dysfunction.    24 hr UFC was normal.  Thyroid labs in normal range.  Obesity complicated by HTN ( on medications)  No DM, REY.  Off note she is on Bupropion Xl 150 mg.    Plan:  Started  phentermine 37.5 mg/day (10/3/2017)   Lost 12 lbs on it.  No major weight loss in last 3 months.  Blood pressure and heart rate is normal but she is complaining of dry mouth and insomnia while on phentermine.  Ambien is not working now.  I discussed that now her weight has plateaued we might consider stopping phentermine.  I discussed combination pill options including Qysemia as well as discussed Topamax.  Plan:  Decrease phentermine to 15 mg  Start Topamax at 25 mg per day and gradually increase up to 100 mg per day  Follow-up in 3 months   The patient is advised to Make better food choices: reduce carbs, Reduce portion size, weight loss and exercise 3-4 times a week.  History of PVC- under control  ECHO -2011 was in normal range  No history of Valvular heart disease.  No family history of CAD.    Topamax possible s/e- side effects:   Discussed possible s/e of topiramate including paresthesias, blood in urine, somnolence, and difficulty concentrating. Topiramate has also been associated with metabolic acidosis and kidney stones.  Also risk of risk of narrow-angle glaucoma causing eye pain or vision loss, slowing down of thinking, decreased sweating and nausea.    All side effects of phentermine  including risk for HTN, palpitations, ischemic events, insomnia, CP, dry mouth, risk for valvular heart disease, restlessness etc.were discussed in detailed. There is also a abuse potential and patient was instructed to use the medication as prescribed.   Discussed indications, risks and benefits of all medications prescribed, and answered questions to patient's satisfaction.  H/o hypertension, well controlled on medication  The patient indicates understanding of these issues and agrees with the plan.    More than 50% of the time spent with Ms. Sears on counseling / coordinating her care.        Follow-up:  3 months    Tiana Anderson MD  Endocrinology   [facility]    CC: Trisha Guerra  [place]      Again, thank you for allowing me to participate in the care of your patient.        Sincerely,        Tiana Anderson MD

## 2018-06-14 DIAGNOSIS — Z78.0 MENOPAUSE: ICD-10-CM

## 2018-06-14 DIAGNOSIS — F33.1 MODERATE RECURRENT MAJOR DEPRESSION (H): ICD-10-CM

## 2018-06-14 DIAGNOSIS — I10 BENIGN HYPERTENSION: ICD-10-CM

## 2018-06-14 NOTE — TELEPHONE ENCOUNTER
"Requested Prescriptions   Pending Prescriptions Disp Refills     losartan (COZAAR) 50 MG tablet [Pharmacy Med Name: Losartan Potassium 50 MG Oral Tablet]    Last Written Prescription Date:  5/31/2017  Last Fill Quantity: 90,  # refills: 3   Last office visit: 5/31/2017 with prescribing provider: Trisha Guerra Office Visit:     90 tablet 3     Sig: Take 1 tablet (50 mg) by mouth daily.    Angiotensin-II Receptors Failed    6/14/2018  8:50 AM       Failed - Normal serum creatinine on file in past 12 months    Recent Labs   Lab Test  05/24/17   0941   CR  1.14*            Failed - Normal serum potassium on file in past 12 months    Recent Labs   Lab Test  05/24/17   0941   POTASSIUM  3.9                   Passed - Blood pressure under 140/90 in past 12 months    BP Readings from Last 3 Encounters:   04/03/18 132/80   01/09/18 110/84   10/03/17 122/88                Passed - Recent (12 mo) or future (30 days) visit within the authorizing provider's specialty    Patient had office visit in the last 12 months or has a visit in the next 30 days with authorizing provider or within the authorizing provider's specialty.  See \"Patient Info\" tab in inbasket, or \"Choose Columns\" in Meds & Orders section of the refill encounter.           Passed - Patient is age 18 or older       Passed - No active pregnancy on record       Passed - No positive pregnancy test in past 12 months        buPROPion (WELLBUTRIN XL) 150 MG 24 hr tablet [Pharmacy Med Name: buPROPion (WELLBUTRIN XL) 150 mg tablet 24 HR]    Last Written Prescription Date:  6/14/2017  Last Fill Quantity: 90,  # refills: 3   Last office visit: 5/31/2017 with prescribing provider:  Trisha Guerra Office Visit:     90 tablet 3     Sig: Take 1 tablet (150 mg) by mouth daily every morning.    SSRIs Protocol Failed    6/14/2018  8:50 AM       Failed - PHQ-9 score less than 5 in past 6 months    Please review last PHQ-9 score.     PHQ-9 " "SCORE 6/9/2016 5/2/2017 5/31/2017   Total Score - - -   Total Score MyChart - - -   Total Score 6 10 9     GERARDO-7 SCORE 6/9/2016 5/2/2017 5/31/2017   Total Score 4 6 6                Failed - Recent (6 mo) or future (30 days) visit within the authorizing provider's specialty    Patient had office visit in the last 6 months or has a visit in the next 30 days with authorizing provider or within the authorizing provider's specialty.  See \"Patient Info\" tab in inbasket, or \"Choose Columns\" in Meds & Orders section of the refill encounter.           Passed - Medication is Bupropion    If the medication is Bupropion (Wellbutrin), and the patient is taking for smoking cessation; OK to refill.         Passed - Patient is age 18 or older       Passed - No active pregnancy on record       Passed - No positive pregnancy test in last 12 months        JINTELI 1-5 MG-MCG per tablet [Pharmacy Med Name: ethin estradiol-norethind ace (FEMHRT 1/5) 1-5 mg-mcg tablet]    Last Written Prescription Date:  6/30/2017  Last Fill Quantity: 90,  # refills: 3   Last office visit: 5/31/2017 with prescribing provider:  Trisha Guerra     Future Office Visit:     90 tablet 3     Sig: Take 1 tablet by mouth daily    Hormone Replacement Therapy Passed    6/14/2018  8:50 AM       Passed - Blood pressure under 140/90 in past 12 months    BP Readings from Last 3 Encounters:   04/03/18 132/80   01/09/18 110/84   10/03/17 122/88                Passed - Recent (12 mo) or future (30 days) visit within the authorizing provider's specialty    Patient had office visit in the last 12 months or has a visit in the next 30 days with authorizing provider or within the authorizing provider's specialty.  See \"Patient Info\" tab in inbasket, or \"Choose Columns\" in Meds & Orders section of the refill encounter.           Passed - Patient is 18 years of age or older       Passed - No active pregnancy on record       Passed - No positive pregnancy test on " record in past 12 months

## 2018-06-19 RX ORDER — BUPROPION HYDROCHLORIDE 150 MG/1
TABLET ORAL
Qty: 90 TABLET | Refills: 0 | Status: SHIPPED | OUTPATIENT
Start: 2018-06-19 | End: 2018-07-26

## 2018-06-19 RX ORDER — NORETHINDRONE ACETATE AND ETHINYL ESTRADIOL 1; 5 MG/1; UG/1
TABLET ORAL
Qty: 90 TABLET | Refills: 0 | Status: SHIPPED | OUTPATIENT
Start: 2018-06-19 | End: 2018-07-26

## 2018-06-19 RX ORDER — LOSARTAN POTASSIUM 50 MG/1
TABLET ORAL
Qty: 90 TABLET | Refills: 0 | Status: SHIPPED | OUTPATIENT
Start: 2018-06-19 | End: 2019-08-14

## 2018-06-19 NOTE — TELEPHONE ENCOUNTER
"Requested Prescriptions   Pending Prescriptions Disp Refills     losartan (COZAAR) 50 MG tablet [Pharmacy Med Name: Losartan Potassium 50 MG Oral Tablet] 90 tablet 3     Sig: Take 1 tablet (50 mg) by mouth daily.    Angiotensin-II Receptors Failed    6/14/2018  9:29 AM       Failed - Normal serum creatinine on file in past 12 months    Recent Labs   Lab Test  05/24/17   0941   CR  1.14*          Failed - Normal serum potassium on file in past 12 months    Recent Labs   Lab Test  05/24/17   0941   POTASSIUM  3.9           Passed - Blood pressure under 140/90 in past 12 months    BP Readings from Last 3 Encounters:   04/03/18 132/80   01/09/18 110/84   10/03/17 122/88          Passed - Recent (12 mo) or future (30 days) visit within the authorizing provider's specialty    Patient had office visit in the last 12 months or has a visit in the next 30 days with authorizing provider or within the authorizing provider's specialty.  See \"Patient Info\" tab in [employer], or \"Choose Columns\" in Meds & Orders section of the refill encounter.           Passed - Patient is age 18 or older       Passed - No active pregnancy on record       Passed - No positive pregnancy test in past 12 months        buPROPion (WELLBUTRIN XL) 150 MG 24 hr tablet [Pharmacy Med Name: buPROPion (WELLBUTRIN XL) 150 mg tablet 24 HR] 90 tablet 3     Sig: Take 1 tablet (150 mg) by mouth daily every morning.    SSRIs Protocol Failed    6/14/2018  9:29 AM       Failed - PHQ-9 score less than 5 in past 6 months    Please review last PHQ-9 score.   PHQ-9 SCORE 6/9/2016 5/2/2017 5/31/2017   Total Score - - -   Total Score MyChart - - -   Total Score 6 10 9          Failed - Recent (6 mo) or future (30 days) visit within the authorizing provider's specialty    Patient had office visit in the last 6 months or has a visit in the next 30 days with authorizing provider or within the authorizing provider's specialty.  See \"Patient Info\" tab in [employer], or \"Choose " "Columns\" in Meds & Orders section of the refill encounter.           Passed - Medication is Bupropion    If the medication is Bupropion (Wellbutrin), and the patient is taking for smoking cessation; OK to refill.         Passed - Patient is age 18 or older       Passed - No active pregnancy on record       Passed - No positive pregnancy test in last 12 months        JINTELI 1-5 MG-MCG per tablet [Pharmacy Med Name: ethin estradiol-norethind ace (FEMHRT 1/5) 1-5 mg-mcg tablet] 90 tablet 3     Sig: Take 1 tablet by mouth daily    Hormone Replacement Therapy Passed    6/14/2018  9:29 AM       Passed - Blood pressure under 140/90 in past 12 months    BP Readings from Last 3 Encounters:   04/03/18 132/80   01/09/18 110/84   10/03/17 122/88          Passed - Recent (12 mo) or future (30 days) visit within the authorizing provider's specialty    Patient had office visit in the last 12 months or has a visit in the next 30 days with authorizing provider or within the authorizing provider's specialty.  See \"Patient Info\" tab in [employer], or \"Choose Columns\" in Meds & Orders section of the refill encounter.           Passed - Patient is 18 years of age or older       Passed - No active pregnancy on record       Passed - No positive pregnancy test on record in past 12 months        losartan (COZAAR) 50 MG tablet  Medication is being filled for 1 time refill only due to:  Future labs ordered CMP.    buPROPion (WELLBUTRIN XL) 150 MG 24 hr tablet  Prescription approved per [facility] Refill Protocol.  Due for OV around 07/2018    norethindrone-ethinyl estradiol (FEMHRT 1/5) 1-5 MG-MCG per tablet  Prescription approved per [facility] Refill Protocol.  Due for OV around 07/2018    Please assist with scheduling labs and OV 07/2018.    Becky Graham, RN, BSN           "

## 2018-06-27 NOTE — TELEPHONE ENCOUNTER
Sent [employer] message to schedule appointment.     Elayne Ayers CMA ([facility])

## 2018-07-26 ENCOUNTER — OFFICE VISIT (OUTPATIENT)
Dept: PEDIATRICS | Facility: CLINIC | Age: 48
End: 2018-07-26
Payer: COMMERCIAL

## 2018-07-26 VITALS
HEART RATE: 86 BPM | TEMPERATURE: 98.7 F | OXYGEN SATURATION: 99 % | SYSTOLIC BLOOD PRESSURE: 138 MMHG | BODY MASS INDEX: 30.36 KG/M2 | HEIGHT: 66 IN | WEIGHT: 188.9 LBS | DIASTOLIC BLOOD PRESSURE: 88 MMHG

## 2018-07-26 DIAGNOSIS — Z78.0 MENOPAUSE: ICD-10-CM

## 2018-07-26 DIAGNOSIS — H81.03 MENIERE'S DISEASE, BILATERAL: ICD-10-CM

## 2018-07-26 DIAGNOSIS — T78.40XA ALLERGIC REACTION, INITIAL ENCOUNTER: ICD-10-CM

## 2018-07-26 DIAGNOSIS — I10 BENIGN HYPERTENSION: ICD-10-CM

## 2018-07-26 DIAGNOSIS — E66.9 NON MORBID OBESITY, UNSPECIFIED OBESITY TYPE: ICD-10-CM

## 2018-07-26 DIAGNOSIS — Z11.4 SCREENING FOR HIV (HUMAN IMMUNODEFICIENCY VIRUS): ICD-10-CM

## 2018-07-26 DIAGNOSIS — F51.01 PRIMARY INSOMNIA: ICD-10-CM

## 2018-07-26 DIAGNOSIS — M51.369 DDD (DEGENERATIVE DISC DISEASE), LUMBAR: ICD-10-CM

## 2018-07-26 DIAGNOSIS — F33.1 MODERATE RECURRENT MAJOR DEPRESSION (H): ICD-10-CM

## 2018-07-26 DIAGNOSIS — Z00.00 ROUTINE GENERAL MEDICAL EXAMINATION AT A HEALTH CARE FACILITY: Primary | ICD-10-CM

## 2018-07-26 LAB
CREAT UR-MCNC: 26 MG/DL
MICROALBUMIN UR-MCNC: <5 MG/L
MICROALBUMIN/CREAT UR: NORMAL MG/G CR (ref 0–25)

## 2018-07-26 PROCEDURE — 99213 OFFICE O/P EST LOW 20 MIN: CPT | Mod: 25 | Performed by: INTERNAL MEDICINE

## 2018-07-26 PROCEDURE — 80048 BASIC METABOLIC PNL TOTAL CA: CPT | Performed by: INTERNAL MEDICINE

## 2018-07-26 PROCEDURE — 82043 UR ALBUMIN QUANTITATIVE: CPT | Performed by: INTERNAL MEDICINE

## 2018-07-26 PROCEDURE — 99396 PREV VISIT EST AGE 40-64: CPT | Performed by: INTERNAL MEDICINE

## 2018-07-26 PROCEDURE — 36415 COLL VENOUS BLD VENIPUNCTURE: CPT | Performed by: INTERNAL MEDICINE

## 2018-07-26 PROCEDURE — 87389 HIV-1 AG W/HIV-1&-2 AB AG IA: CPT | Performed by: INTERNAL MEDICINE

## 2018-07-26 RX ORDER — CYCLOBENZAPRINE HCL 10 MG
10 TABLET ORAL
Qty: 90 TABLET | Refills: 3 | Status: SHIPPED | OUTPATIENT
Start: 2018-07-26 | End: 2019-08-14

## 2018-07-26 RX ORDER — PREDNISONE 20 MG/1
40 TABLET ORAL DAILY
Qty: 10 TABLET | Refills: 0 | Status: SHIPPED | OUTPATIENT
Start: 2018-07-26 | End: 2018-07-31

## 2018-07-26 RX ORDER — LOSARTAN POTASSIUM 50 MG/1
TABLET ORAL
Qty: 90 TABLET | Refills: 3 | Status: CANCELLED | OUTPATIENT
Start: 2018-07-26

## 2018-07-26 RX ORDER — LOSARTAN POTASSIUM 100 MG/1
100 TABLET ORAL DAILY
Qty: 90 TABLET | Refills: 3 | Status: SHIPPED | OUTPATIENT
Start: 2018-07-26 | End: 2019-08-14

## 2018-07-26 RX ORDER — NORETHINDRONE ACETATE AND ETHINYL ESTRADIOL 1; 5 MG/1; UG/1
1 TABLET ORAL DAILY
Qty: 90 TABLET | Refills: 3 | Status: SHIPPED | OUTPATIENT
Start: 2018-07-26 | End: 2019-08-14

## 2018-07-26 RX ORDER — BUPROPION HYDROCHLORIDE 150 MG/1
TABLET ORAL
Qty: 90 TABLET | Refills: 3 | Status: SHIPPED | OUTPATIENT
Start: 2018-07-26 | End: 2019-08-14

## 2018-07-26 ASSESSMENT — ANXIETY QUESTIONNAIRES
IF YOU CHECKED OFF ANY PROBLEMS ON THIS QUESTIONNAIRE, HOW DIFFICULT HAVE THESE PROBLEMS MADE IT FOR YOU TO DO YOUR WORK, TAKE CARE OF THINGS AT HOME, OR GET ALONG WITH OTHER PEOPLE: SOMEWHAT DIFFICULT
2. NOT BEING ABLE TO STOP OR CONTROL WORRYING: MORE THAN HALF THE DAYS
5. BEING SO RESTLESS THAT IT IS HARD TO SIT STILL: NOT AT ALL
3. WORRYING TOO MUCH ABOUT DIFFERENT THINGS: MORE THAN HALF THE DAYS
GAD7 TOTAL SCORE: 9
7. FEELING AFRAID AS IF SOMETHING AWFUL MIGHT HAPPEN: SEVERAL DAYS
6. BECOMING EASILY ANNOYED OR IRRITABLE: SEVERAL DAYS
1. FEELING NERVOUS, ANXIOUS, OR ON EDGE: SEVERAL DAYS

## 2018-07-26 ASSESSMENT — ENCOUNTER SYMPTOMS
NERVOUS/ANXIOUS: 1
DIZZINESS: 1

## 2018-07-26 ASSESSMENT — PATIENT HEALTH QUESTIONNAIRE - PHQ9: 5. POOR APPETITE OR OVEREATING: MORE THAN HALF THE DAYS

## 2018-07-26 NOTE — PATIENT INSTRUCTIONS
Go ahead and schedule with ENT. Dr. Huggins    When you have a spasm go ahead and take flexeril every 10 every 4 hours. Use moist heat like baths, showers and massage.     Check blood pressures and keep log. Let me know if they get too low.     Preventive Health Recommendations  Female Ages 40 to 49    Yearly exam:     See your health care provider every year in order to  1. Review health changes.   2. Discuss preventive care.    3. Review your medicines if your doctor prescribed any.      Get a Pap test every three years (unless you have an abnormal result and your provider advises testing more often).      If you get Pap tests with HPV test, you only need to test every 5 years, unless you have an abnormal result. You do not need a Pap test if your uterus was removed (hysterectomy) and you have not had cancer.      You should be tested each year for STDs (sexually transmitted diseases), if you're at risk.     Ask your doctor if you should have a mammogram.      Have a colonoscopy (test for colon cancer) if someone in your family has had colon cancer or polyps before age 50.       Have a cholesterol test every 5 years.       Have a diabetes test (fasting glucose) after age 45. If you are at risk for diabetes, you should have this test every 3 years.    Shots: Get a flu shot each year. Get a tetanus shot every 10 years.     Nutrition:     Eat at least 5 servings of fruits and vegetables each day.    Eat whole-grain bread, whole-wheat pasta and brown rice instead of white grains and rice.    Get adequate Calcium and Vitamin D.      Lifestyle    Exercise at least 150 minutes a week (an average of 30 minutes a day, 5 days a week). This will help you control your weight and prevent disease.    Limit alcohol to one drink per day.    No smoking.     Wear sunscreen to prevent skin cancer.    See your dentist every six months for an exam and cleaning.

## 2018-07-26 NOTE — MR AVS SNAPSHOT
After Visit Summary   7/26/2018    Lorena Sears    MRN: [id number]           Patient Information     Date Of Birth          1970        Visit Information        Provider Department      7/26/2018 11:00 AM Trisha Guerra MD [facility]        Today's Diagnoses     Routine general medical examination at a health care facility    -  1    Benign hypertension        Moderate recurrent major depression (H)        DDD (degenerative disc disease), lumbar        Meniere's disease, bilateral        Menopause        Primary insomnia        Allergic reaction, initial encounter        Non morbid obesity, unspecified obesity type        Screening for HIV (human immunodeficiency virus)          Care Instructions    Go ahead and schedule with ENT. Dr. Huggins    When you have a spasm go ahead and take flexeril every 10 every 4 hours. Use moist heat like baths, showers and massage.     Check blood pressures and keep log. Let me know if they get too low.     Preventive Health Recommendations  Female Ages 40 to 49    Yearly exam:     See your health care provider every year in order to  1. Review health changes.   2. Discuss preventive care.    3. Review your medicines if your doctor prescribed any.      Get a Pap test every three years (unless you have an abnormal result and your provider advises testing more often).      If you get Pap tests with HPV test, you only need to test every 5 years, unless you have an abnormal result. You do not need a Pap test if your uterus was removed (hysterectomy) and you have not had cancer.      You should be tested each year for STDs (sexually transmitted diseases), if you're at risk.     Ask your doctor if you should have a mammogram.      Have a colonoscopy (test for colon cancer) if someone in your family has had colon cancer or polyps before age 50.       Have a cholesterol test every 5 years.       Have a diabetes test (fasting glucose) after age 45.  If you are at risk for diabetes, you should have this test every 3 years.    Shots: Get a flu shot each year. Get a tetanus shot every 10 years.     Nutrition:     Eat at least 5 servings of fruits and vegetables each day.    Eat whole-grain bread, whole-wheat pasta and brown rice instead of white grains and rice.    Get adequate Calcium and Vitamin D.      Lifestyle    Exercise at least 150 minutes a week (an average of 30 minutes a day, 5 days a week). This will help you control your weight and prevent disease.    Limit alcohol to one drink per day.    No smoking.     Wear sunscreen to prevent skin cancer.    See your dentist every six months for an exam and cleaning.          Follow-ups after your visit        Additional Services     OTOLARYNGOLOGY REFERRAL       Your provider has referred you to: [facility] [phone]   [email or web address]    Please be aware that coverage of these services is subject to the terms and limitations of your health insurance plan.  Call member services at your health plan with any benefit or coverage questions.      Please bring the following with you to your appointment:    (1) Any X-Rays, CTs or MRIs which have been performed.  Contact the facility where they were done to arrange for  prior to your scheduled appointment.   (2) List of current medications  (3) This referral request   (4) Any documents/labs given to you for this referral                  Follow-up notes from your care team     Return in about 1 year (around 7/26/2019) for Physical Exam.      Who to contact     If you have questions or need follow up information about today's clinic visit or your schedule please contact [facility] directly at [phone].  Normal or non-critical lab and imaging results will be communicated to you by MyChart, letter or phone within 4 business days after the clinic has received the results. If you do not hear from us  "within 7 days, please contact the clinic through [employer] or phone. If you have a critical or abnormal lab result, we will notify you by phone as soon as possible.  Submit refill requests through [employer] or call your pharmacy and they will forward the refill request to us. Please allow 3 business days for your refill to be completed.          Additional Information About Your Visit        C[employer] Information     [employer] gives you secure access to your electronic health record. If you see a primary care provider, you can also send messages to your care team and make appointments. If you have questions, please call your primary care clinic.  If you do not have a primary care provider, please call [phone] and they will assist you.        Care EveryWhere ID     This is your Care EveryWhere ID. This could be used by other organizations to access your [place] medical records  [id number]        Your Vitals Were     Pulse Temperature Height Pulse Oximetry BMI (Body Mass Index)       86 98.7  F (37.1  C) (Oral) 5' 6.25\" (1.683 m) 99% 30.26 kg/m2        Blood Pressure from Last 3 Encounters:   07/26/18 138/88   04/03/18 132/80   01/09/18 110/84    Weight from Last 3 Encounters:   07/26/18 188 lb 14.4 oz (85.7 kg)   04/03/18 186 lb 9.6 oz (84.6 kg)   01/09/18 187 lb 9.6 oz (85.1 kg)              We Performed the Following     Albumin Random Urine Quantitative with Creat Ratio     BASIC METABOLIC PANEL     HIV Screening     OTOLARYNGOLOGY REFERRAL          Today's Medication Changes          These changes are accurate as of 7/26/18 11:46 AM.  If you have any questions, ask your nurse or doctor.               Start taking these medicines.        Dose/Directions    predniSONE 20 MG tablet   Commonly known as:  DELTASONE   Used for:  Allergic reaction, initial encounter   Started by:  Trisha Guerra MD        Dose:  40 mg   Take 2 tablets (40 mg) by mouth daily for 5 days   Quantity:  10 tablet   Refills:  0    "      These medicines have changed or have updated prescriptions.        Dose/Directions    * losartan 50 MG tablet   Commonly known as:  COZAAR   This may have changed:  Another medication with the same name was added. Make sure you understand how and when to take each.   Used for:  Benign hypertension, Moderate recurrent major depression (H), Menopause   Changed by:  Trisha Guerra MD        Take 1 tablet (50 mg) by mouth daily.   Quantity:  90 tablet   Refills:  0       * losartan 100 MG tablet   Commonly known as:  COZAAR   This may have changed:  You were already taking a medication with the same name, and this prescription was added. Make sure you understand how and when to take each.   Used for:  Benign hypertension   Changed by:  Trisha Guerra MD        Dose:  100 mg   Take 1 tablet (100 mg) by mouth daily   Quantity:  90 tablet   Refills:  3       * Notice:  This list has 2 medication(s) that are the same as other medications prescribed for you. Read the directions carefully, and ask your doctor or other care provider to review them with you.      Stop taking these medicines if you haven't already. Please contact your care team if you have questions.     topiramate 25 MG tablet   Commonly known as:  TOPAMAX   Stopped by:  Trisha Guerra MD                Where to get your medicines      These medications were sent to [address] [address]     Phone:  [phone]     buPROPion 150 MG 24 hr tablet    cyclobenzaprine 10 MG tablet    losartan 100 MG tablet    norethindrone-ethinyl estradiol 1-5 MG-MCG per tablet    predniSONE 20 MG tablet                Primary Care Provider Office Phone # Fax #    Trisha Guerra [phone] [address] [place] [address]        Equal Access to Services     KARYN LYON AH: Dalton kramer  Soshekhar, davisda luqadaha, qaybta kashantanu gonzalez, harriet love padminikrissy strattonandreia nader. So [facility] [phone].    ATENCIÓN: Si maxim anguiano, tiene a alfred disposición servicios gratuitos de asistencia lingüística. Randee al [phone].    We comply with applicable federal civil rights laws and [place] laws. We do not discriminate on the basis of race, color, national origin, age, disability, sex, sexual orientation, or gender identity.            Thank you!     Thank you for choosing [facility]  for your care. Our goal is always to provide you with excellent care. Hearing back from our patients is one way we can continue to improve our services. Please take a few minutes to complete the written survey that you may receive in the mail after your visit with us. Thank you!             Your Updated Medication List - Protect others around you: Learn how to safely use, store and throw away your medicines at www.disposemymeds.org.          This list is accurate as of 7/26/18 11:46 AM.  Always use your most recent med list.                   Brand Name Dispense Instructions for use Diagnosis    buPROPion 150 MG 24 hr tablet    WELLBUTRIN XL    90 tablet    Take 1 tablet (150 mg) by mouth daily every morning.    Moderate recurrent major depression (H), Benign hypertension, Menopause       cyclobenzaprine 10 MG tablet    FLEXERIL    90 tablet    Take 1 tablet (10 mg) by mouth nightly as needed for muscle spasms    DDD (degenerative disc disease), lumbar       ibuprofen 200 MG tablet    ADVIL/MOTRIN     Take 200 mg by mouth every 4 hours as needed.        * losartan 50 MG tablet    COZAAR    90 tablet    Take 1 tablet (50 mg) by mouth daily.    Benign hypertension, Moderate recurrent major depression (H), Menopause       * losartan 100 MG tablet    COZAAR    90 tablet    Take 1 tablet (100 mg) by mouth daily    Benign hypertension       norethindrone-ethinyl estradiol 1-5 MG-MCG per tablet    JINTELI    90  tablet    Take 1 tablet by mouth daily    Menopause, Benign hypertension, Moderate recurrent major depression (H)       predniSONE 20 MG tablet    DELTASONE    10 tablet    Take 2 tablets (40 mg) by mouth daily for 5 days    Allergic reaction, initial encounter       zolpidem 10 MG tablet    AMBIEN    30 tablet    Take 1 tablet (10 mg) by mouth nightly as needed for sleep    Primary insomnia       * Notice:  This list has 2 medication(s) that are the same as other medications prescribed for you. Read the directions carefully, and ask your doctor or other care provider to review them with you.

## 2018-07-26 NOTE — PROGRESS NOTES
SUBJECTIVE:   CC: Lorena Sears is an 48 year old woman who presents for preventive health visit.     Physical   Annual:     Getting at least 3 servings of Calcium per day:  Yes    Bi-annual eye exam:  Yes    Dental care twice a year:  NO    Sleep apnea or symptoms of sleep apnea:  None    Diet:  Regular (no restrictions)    Frequency of exercise:  2-3 days/week    Duration of exercise:  30-45 minutes    Taking medications regularly:  Yes    Medication side effects:  None    Additional concerns today:  No      Notes in the past 2 weeks she is having significant meniere's sx and thinks she may be losing some hearing. Looking for referral for audiologist.     Reports she had an episode of back spasm that lasted 14 hours. Has only 2 episodes a year but last one was very severe. She used flexeril, Vicodin, ice, heat. TENS unit with not improvement. Notes she had 1 spasm every 60 seconds.     States blood pressures have been high due to stress at work. Has had flushing in face, headaches and other sx of hypertension. Does not think work stress will improve anytime soon. Working 3 scheduled shifts per week, but also has to go to mandatory meetings.     Notes she has been having more anxiety due to work stress. Had a small panic attack the other night. Is not doing much to take care of herself. Is not sleeping well at night, but states Ambien is working, cannot eat before taking medications.     BP in clinic was 138/88; weight was 188 lbs. Believes she is working hard on balancing her diet, exercises regularly, but would like to increase.     Will be going on trip in the future and is allergic to sand flies and gets welts. Benadryl does not help, has been on steroids in the past.     Today's PHQ-2 Score:   PHQ-2 ( 1999 Pf[employer]) 7/26/2018   Q1: Little interest or pleasure in doing things 2   Q2: Feeling down, depressed or hopeless 0   PHQ-2 Score 2   Q1: Little interest or pleasure in doing things More than half the days    Q2: Feeling down, depressed or hopeless Not at all   PHQ-2 Score 2       Abuse: Current or Past(Physical, Sexual or Emotional)- No  Do you feel safe in your environment - Yes    Social History   Substance Use Topics     Smoking status: Former Smoker     Types: Cigarettes     Quit date: 8/1/2011     Smokeless tobacco: Never Used     Alcohol use Yes      Comment: 1 drink a day     Alcohol Use 7/26/2018   If you drink alcohol do you typically have greater than 3 drinks per day OR greater than 7 drinks per week? No   No flowsheet data found.    Reviewed orders with patient.  Reviewed health maintenance and updated orders accordingly - Yes  Labs reviewed in EPIC  BP Readings from Last 3 Encounters:   07/26/18 138/88   04/03/18 132/80   01/09/18 110/84    Wt Readings from Last 3 Encounters:   07/26/18 85.7 kg (188 lb 14.4 oz)   04/03/18 84.6 kg (186 lb 9.6 oz)   01/09/18 85.1 kg (187 lb 9.6 oz)                  Patient Active Problem List   Diagnosis     CARDIOVASCULAR SCREENING; LDL GOAL LESS THAN 160     Moderate recurrent major depression (H)     PVC (premature ventricular contraction)     DDD (degenerative disc disease), lumbar     Benign hypertension     Neck Pain     Menopause     Episodic tension-type headache, not intractable     Active cochlear Meniere's disease, right     Non morbid obesity, unspecified obesity type     Past Surgical History:   Procedure Laterality Date     WRIST SURGERY      cyst removal on right wrist       Social History   Substance Use Topics     Smoking status: Former Smoker     Types: Cigarettes     Quit date: 8/1/2011     Smokeless tobacco: Never Used     Alcohol use Yes      Comment: 1 drink a day     Family History   Problem Relation Age of Onset     Hypertension Mother      Thyroid Disease Mother      Hypertension Father      Cancer Father      non Hodgkin lymphoma./waldenstroms     Breast Cancer Maternal Grandmother      older at diagnosis     Alzheimer Disease Maternal Grandfather       Neurologic Disorder Maternal Grandfather      parkinsons     Cancer Maternal Grandfather      Alzheimer Disease Paternal Grandmother      Cancer Paternal Grandfather      Breast Cancer Maternal Aunt 50         Current Outpatient Prescriptions   Medication Sig Dispense Refill     buPROPion (WELLBUTRIN XL) 150 MG 24 hr tablet Take 1 tablet (150 mg) by mouth daily every morning. 90 tablet 0     cyclobenzaprine (FLEXERIL) 10 MG tablet Take 1 tablet (10 mg) by mouth nightly as needed for muscle spasms 90 tablet 3     ibuprofen (ADVIL,MOTRIN) 200 MG tablet Take 200 mg by mouth every 4 hours as needed.       JINTELI 1-5 MG-MCG per tablet Take 1 tablet by mouth daily 90 tablet 0     losartan (COZAAR) 50 MG tablet Take 1 tablet (50 mg) by mouth daily. 90 tablet 0     zolpidem (AMBIEN) 10 MG tablet Take 1 tablet (10 mg) by mouth nightly as needed for sleep 30 tablet 5     topiramate (TOPAMAX) 25 MG tablet Take 4 tablets (100 mg) by mouth 2 times daily (Patient not taking: Reported on 7/26/2018) 120 tablet 3     No Known Allergies    Patient under age 50, mutual decision reflected in health maintenance.      Pertinent mammograms are reviewed under the imaging tab.  History of abnormal Pap smear:   Last 3 Pap Results:   PAP (no units)   Date Value   03/19/2015 NIL   02/14/2012 NIL     PAP / HPV Latest Ref Rng & Units 3/19/2015 2/14/2012   PAP - NIL NIL   HPV 16 DNA NEG Negative -   HPV 18 DNA NEG Negative -   OTHER HR HPV NEG Negative -     Reviewed and updated as needed this visit by clinical staff  Tobacco  Allergies  Meds  Med Hx  Surg Hx  Fam Hx  Soc Hx        Reviewed and updated as needed this visit by Provider            Review of Systems   HENT: Positive for hearing loss.    Neurological: Positive for dizziness.   Psychiatric/Behavioral: The patient is nervous/anxious.    All other systems reviewed and are negative.       This document serves as a record of the services and decisions personally performed and  "made by Trisha Guerra MD. It was created on her behalf by Ramona Yanez, a trained medical scribe. The creation of this document is based the provider's statements to the medical scribe.    Ramona Yanez July 26, 2018 11:15 AM   OBJECTIVE:   /88 (BP Location: Right arm, Patient Position: Sitting, Cuff Size: Adult Regular)  Pulse 86  Temp 98.7  F (37.1  C) (Oral)  Ht 5' 6.25\" (1.683 m)  Wt 188 lb 14.4 oz (85.7 kg)  SpO2 99%  BMI 30.26 kg/m2  Physical Exam  GENERAL APPEARANCE:  alert and no distress  EYES: Eyes grossly normal to inspection, PERRL and conjunctivae and sclerae normal  HENT: ear canals and TM's normal, nose and mouth without ulcers or lesions, oropharynx clear and oral mucous membranes moist  NECK: no adenopathy, no asymmetry, masses, or scars and thyroid normal to palpation  RESP: lungs clear to auscultation - no rales, rhonchi or wheezes  BREAST: normal without masses, tenderness or nipple discharge and no palpable axillary masses or adenopathy  CV: regular rate and rhythm, normal S1 S2, no S3 or S4, no murmur, click or rub, no peripheral edema   ABDOMEN: soft, nontender, no hepatosplenomegaly, no masses and bowel sounds normal  MS: no musculoskeletal defects are noted and gait is age appropriate without ataxia  SKIN: no suspicious lesions or rashes  NEURO: Normal strength and tone, sensory exam grossly normal, mentation intact and speech normal  PSYCH: mentation appears normal and affect normal/bright    Diagnostic Test Results:  none     ASSESSMENT/PLAN:   (Z00.00) Routine general medical examination at a health care facility  (primary encounter diagnosis)  -- immunizations utd   -- pap utd  -- mammo utd   -- colonoscopy utd   -- encouraged regular exercise and balanced diet       (I10) Benign hypertension  -- will increase losartan to 100 MG; at upper end of BP range and is having significant sx of hypertension   -- advised to check BP, keep log, let me know if getting too low "   Plan: BASIC METABOLIC PANEL, Albumin Random Urine         Quantitative with Creat Ratio, buPROPion         (WELLBUTRIN XL) 150 MG 24 hr tablet,         norethindrone-ethinyl estradiol (JINTELI) 1-5         MG-MCG per tablet, losartan (COZAAR) 100 MG         tablet            (F33.1) Moderate recurrent major depression (H)  -- advised to find and increase self care measures especially during work   -- discussed it is a possibility to reduce hours at work if she wishes   -- discussed option to increase Wellbutrin for better anxiety control, patient wishes not to increase medication dose  -- advised patient to contact me if she wishes to increase dose/ anxiety is worsening    -- encouraged regular exercise and balanced diet for anxiety control   -- discussed differences between emotional vs physical fatigue   Plan: buPROPion (WELLBUTRIN XL) 150 MG 24 hr tablet,         norethindrone-ethinyl estradiol (JINTELI) 1-5         MG-MCG per tablet            (M51.36) DDD (degenerative disc disease), lumbar  -- recommended using flexeril 10, every 4 hours for severe back spasms, moist heat or go into emergency department if that severe again   Plan: cyclobenzaprine (FLEXERIL) 10 MG tablet            (H81.03) Meniere's disease, bilateral  -- advised patient to go and see ENT for meniere's sx   -- discussed ENT will send to audiologist if needed   Plan: OTOLARYNGOLOGY REFERRAL          (Z78.0) Menopause  Plan: buPROPion (WELLBUTRIN XL) 150 MG 24 hr tablet,         norethindrone-ethinyl estradiol (JINTELI) 1-5         MG-MCG per tablet            (F51.01) Primary insomnia  -- encouraged regular exercise and balanced diet       (E66.9) Non morbid obesity, unspecified obesity type  -- encouraged regular exercise and balanced diet       (Z11.4) Screening for HIV (human immunodeficiency virus)  -- taken with routine blood work   Plan: HIV Screening            (T78.40XA) Allergic reaction, initial encounter  -- provided for upcoming  "trip  -- advised if using steroids and welts do not improve, will need to be seen   Plan: predniSONE (DELTASONE) 20 MG tablet           Follow up for annual care or as needed           COUNSELING:  Reviewed preventive health counseling, as reflected in patient instructions       Regular exercise       Healthy diet/nutrition    BP Readings from Last 1 Encounters:   07/26/18 138/88     Estimated body mass index is 30.26 kg/(m^2) as calculated from the following:    Height as of this encounter: 5' 6.25\" (1.683 m).    Weight as of this encounter: 188 lb 14.4 oz (85.7 kg).      Weight management plan: Discussed healthy diet and exercise guidelines and patient will follow up in 12 months in clinic to re-evaluate.     reports that she quit smoking about 6 years ago. Her smoking use included Cigarettes. She has never used smokeless tobacco.      Counseling Resources:  ATP IV Guidelines  Pooled Cohorts Equation Calculator  Breast Cancer Risk Calculator  FRAX Risk Assessment  ICSI Preventive Guidelines  Dietary Guidelines for Americans, 2010  USDA's MyPlate  ASA Prophylaxis  Lung CA Screening    The information in this document, created by the medical scribe for me, accurately reflects the services I personally performed and the decisions made by me. I have reviewed and approved this document for accuracy prior to leaving the patient care area.  Trisha Guerra MD  [facility]  Answers for HPI/ROS submitted by the patient on 7/26/2018   PHQ-2 Score: 2    "

## 2018-07-27 LAB
ANION GAP SERPL CALCULATED.3IONS-SCNC: 7 MMOL/L (ref 3–14)
BUN SERPL-MCNC: 16 MG/DL (ref 7–30)
CALCIUM SERPL-MCNC: 9 MG/DL (ref 8.5–10.1)
CHLORIDE SERPL-SCNC: 108 MMOL/L (ref 94–109)
CO2 SERPL-SCNC: 26 MMOL/L (ref 20–32)
CREAT SERPL-MCNC: 1.1 MG/DL (ref 0.52–1.04)
GFR SERPL CREATININE-BSD FRML MDRD: 53 ML/MIN/1.7M2
GLUCOSE SERPL-MCNC: 78 MG/DL (ref 70–99)
HIV 1+2 AB+HIV1 P24 AG SERPL QL IA: NONREACTIVE
POTASSIUM SERPL-SCNC: 4.1 MMOL/L (ref 3.4–5.3)
SODIUM SERPL-SCNC: 141 MMOL/L (ref 133–144)

## 2018-07-27 ASSESSMENT — ANXIETY QUESTIONNAIRES: GAD7 TOTAL SCORE: 9

## 2018-07-27 ASSESSMENT — PATIENT HEALTH QUESTIONNAIRE - PHQ9: SUM OF ALL RESPONSES TO PHQ QUESTIONS 1-9: 9

## 2018-08-03 ENCOUNTER — TRANSFERRED RECORDS (OUTPATIENT)
Dept: HEALTH INFORMATION MANAGEMENT | Facility: CLINIC | Age: 48
End: 2018-08-03

## 2018-08-03 ENCOUNTER — HOSPITAL ENCOUNTER (OUTPATIENT)
Dept: GENERAL RADIOLOGY | Facility: CLINIC | Age: 48
Discharge: HOME | End: 2018-08-03
Attending: OTOLARYNGOLOGY | Admitting: OTOLARYNGOLOGY
Payer: COMMERCIAL

## 2018-08-03 DIAGNOSIS — R42 DIZZINESS: ICD-10-CM

## 2018-08-03 PROCEDURE — 70120 X-RAY EXAM OF MASTOIDS: CPT

## 2018-08-16 ENCOUNTER — TELEPHONE (OUTPATIENT)
Dept: PEDIATRICS | Facility: CLINIC | Age: 48
End: 2018-08-16

## 2018-08-16 ENCOUNTER — OFFICE VISIT (OUTPATIENT)
Dept: PEDIATRICS | Facility: CLINIC | Age: 48
End: 2018-08-16
Payer: COMMERCIAL

## 2018-08-16 VITALS
WEIGHT: 188.9 LBS | HEART RATE: 101 BPM | SYSTOLIC BLOOD PRESSURE: 124 MMHG | OXYGEN SATURATION: 96 % | HEIGHT: 66 IN | DIASTOLIC BLOOD PRESSURE: 80 MMHG | BODY MASS INDEX: 30.36 KG/M2 | TEMPERATURE: 98 F

## 2018-08-16 DIAGNOSIS — R55 SYNCOPE, UNSPECIFIED SYNCOPE TYPE: Primary | ICD-10-CM

## 2018-08-16 DIAGNOSIS — R55 VASOVAGAL SYNCOPE: ICD-10-CM

## 2018-08-16 LAB
ANION GAP SERPL CALCULATED.3IONS-SCNC: 5 MMOL/L (ref 3–14)
BUN SERPL-MCNC: 24 MG/DL (ref 7–30)
CALCIUM SERPL-MCNC: 9.9 MG/DL (ref 8.5–10.1)
CHLORIDE SERPL-SCNC: 106 MMOL/L (ref 94–109)
CO2 SERPL-SCNC: 27 MMOL/L (ref 20–32)
CREAT SERPL-MCNC: 1.3 MG/DL (ref 0.52–1.04)
ERYTHROCYTE [DISTWIDTH] IN BLOOD BY AUTOMATED COUNT: 11.5 % (ref 10–15)
GFR SERPL CREATININE-BSD FRML MDRD: 44 ML/MIN/1.7M2
GLUCOSE SERPL-MCNC: 97 MG/DL (ref 70–99)
HCT VFR BLD AUTO: 44.7 % (ref 35–47)
HGB BLD-MCNC: 15.4 G/DL (ref 11.7–15.7)
MCH RBC QN AUTO: 33 PG (ref 26.5–33)
MCHC RBC AUTO-ENTMCNC: 34.5 G/DL (ref 31.5–36.5)
MCV RBC AUTO: 96 FL (ref 78–100)
PLATELET # BLD AUTO: 242 10E9/L (ref 150–450)
POTASSIUM SERPL-SCNC: 4.3 MMOL/L (ref 3.4–5.3)
RBC # BLD AUTO: 4.67 10E12/L (ref 3.8–5.2)
SODIUM SERPL-SCNC: 138 MMOL/L (ref 133–144)
WBC # BLD AUTO: 6.7 10E9/L (ref 4–11)

## 2018-08-16 PROCEDURE — 85027 COMPLETE CBC AUTOMATED: CPT | Performed by: PHYSICIAN ASSISTANT

## 2018-08-16 PROCEDURE — 99214 OFFICE O/P EST MOD 30 MIN: CPT | Performed by: PHYSICIAN ASSISTANT

## 2018-08-16 PROCEDURE — 93000 ELECTROCARDIOGRAM COMPLETE: CPT | Performed by: PHYSICIAN ASSISTANT

## 2018-08-16 PROCEDURE — 36415 COLL VENOUS BLD VENIPUNCTURE: CPT | Performed by: PHYSICIAN ASSISTANT

## 2018-08-16 PROCEDURE — 80048 BASIC METABOLIC PNL TOTAL CA: CPT | Performed by: PHYSICIAN ASSISTANT

## 2018-08-16 RX ORDER — TRIAMTERENE AND HYDROCHLOROTHIAZIDE 37.5; 25 MG/1; MG/1
CAPSULE ORAL
Refills: 4 | COMMUNITY
Start: 2018-08-12 | End: 2019-08-14

## 2018-08-16 NOTE — MR AVS SNAPSHOT
After Visit Summary   8/16/2018    Lorena Sears    MRN: [id number]           Patient Information     Date Of Birth          1970        Visit Information        Provider Department      8/16/2018 3:20 PM Milton Lee PA-C [facility]        Today's Diagnoses     Syncope, unspecified syncope type    -  1    Vasovagal syncope          Care Instructions    VAS  Understanding Vasovagal Syncope  Vasovagal syncope is fainting caused by a complex nerve and blood vessel reaction in the body. It s the most common cause of fainting. Unlike other causes of fainting, it s not a sign of a problem with the heart or brain.     How to say it  [id number]  [id number]   How vasovagal syncope happens  Many nerves connect with your heart and blood vessels. These nerves help control the speed and force of your heartbeat. They also regulate blood pressure. They control whether your blood vessels should be more open or more closed.  Usually these nerves work together so you always get enough blood to your brain. In certain cases, these nerves may give a wrong signal. This may cause your blood vessels to open wide. At the same time, your heartbeat slows down. Blood can start to pool in your legs, and not enough of it may reach the brain. If that happens, you may briefly lose consciousness. When you lie down or fall down, blood flow to the brain resumes.  What causes vasovagal syncope?  Many triggers can cause vasovagal syncope, such as:    Standing for long periods    Too much heat    Intense emotion, such as fear    Intense pain    The sight of blood or a needle    Exercising for a long time  Older adults may have additional triggers, such as:    Urinating    Swallowing    Coughing    Having a bowel movement  Symptoms of vasovagal syncope  Fainting is the main symptom of vasovagal syncope. You may have symptoms before fainting such as:    Nausea    Warm, flushed feeling    Face that turns  pale    Sweaty palms    Feeling dizzy    Blurred vision  If you lie down at the first sign of these symptoms, you will often be able to prevent fainting. Not everyone notices symptoms before fainting, however.  When a person does faint, lying down restores blood flow to the brain. Consciousness should return fairly quickly. You might not feel normal for a little while after you faint. You might feel depressed or fatigued for a short time. You may even feel nauseous afterwards and vomit.  Some people have only 1 or 2 episodes of vasovagal syncope in their life. For others, it happens more often and with no warning.  Diagnosing vasovagal syncope  Your healthcare provider will ask about your health history and your symptoms. He or she will give you a physical exam. Your blood pressure may be measured while lying down, seated, and standing. You may also have an electrocardiogram (ECG). This is a simple test that looks at the heart s rhythm.  You may be checked for other possible causes of fainting. You may have other tests such as:    Continuous portable ECG monitoring, to look at heart rhythms over time, such as with a holter or event monitor    Echocardiogram, to look at the blood flow in the heart and the heart s motion    Exercise stress testing, to see how your heart does during exercise    Blood tests to check for signs of disease  If these tests are normal, you may have a tilt table test. For this test, you lie down on a platform. Your heart rate and blood pressure are measured while you are lying down. The platform is then tilted upright. Your heart rate and blood pressure are measured again. If you have vasovagal syncope, you may faint during the upward tilt. Sometimes medicines that increase heart rate and the force of heart contractions are used to try and provoke a syncopal episode.  There are many causes of syncope. Some causes are not dangerous. In older persons, unexplained syncope can be a sign of a  serious infection or a heart attack. Call 911 or seek immediate medical attention to be evaluated, especially if there has been a fall and injury with the syncope. You should not drive yourself to the hospital or emergency department after a syncopal episode for the safety of yourself or other drivers and passengers. Have someone drive you. Your provider may restrict your driving until the cause of the syncope is better understood and to make sure the syncope does not become chronic or if it is unpredictable.  Date Last Reviewed: 3/1/2017    6614-8389 [employer]. [address] [place] [address]. All rights reserved. This information is not intended as a substitute for professional medical care. Always follow your healthcare professional's instructions.        Fainting: Vagal Reaction  Fainting (syncope) is a temporary loss of consciousness that is associated with a loss of postural tone. It s also called passing out. It occurs when blood flow to the brain is less than normal. Your healthcare provider believes that your fainting was because of a vagal reaction. This condition is not a sign of serious disease.  A vagal reaction is a response in your body that causes your pulse to slow down or the blood vessels to expand. This causes your blood pressure to fall. And this sends less blood to your brain if you are standing or sitting. That results in dizziness, near-fainting, or fainting. Lying down usually stops the reaction within 60 seconds.  This response can occur during sudden fear, severe pain, emotional stress, overexertion, overheating, hunger, nausea or vomiting, prolonged standing, or standing up after sitting or lying for a long time.  Home care  Follow these guidelines when caring for yourself at home:    Rest today. Go back to your normal activities as soon as you are feeling back to normal.    Stay hydrated and avoid skipping meals.    If you feel lightheaded or dizzy, lie down  right away. Or sit with your head lowered between your knees.  Follow-up care  Follow up with your healthcare provider, or as advised.  Call 911  Call 911 if any of the following occur:    Another fainting spell that s not explained by the common causes listed above    Pain in your chest, arm, neck, jaw, back, or abdomen    Shortness of breath    Severe headache or seizure    Your heart beats very rapidly, very slowly, or irregularly (palpitations)  Date Last Reviewed: 12/1/2016 2000-2017 [employer]. [address] [place] [address]. All rights reserved. This information is not intended as a substitute for professional medical care. Always follow your healthcare professional's instructions.                Follow-ups after your visit        Who to contact     If you have questions or need follow up information about today's clinic visit or your schedule please contact [facility] directly at [phone].  Normal or non-critical lab and imaging results will be communicated to you by [employer], letter or phone within 4 business days after the clinic has received the results. If you do not hear from us within 7 days, please contact the clinic through [employer] or phone. If you have a critical or abnormal lab result, we will notify you by phone as soon as possible.  Submit refill requests through [employer] or call your pharmacy and they will forward the refill request to us. Please allow 3 business days for your refill to be completed.          Additional Information About Your Visit        [employer] My Style Information     [employer] gives you secure access to your electronic health record. If you see a primary care provider, you can also send messages to your care team and make appointments. If you have questions, please call your primary care clinic.  If you do not have a primary care provider, please call [phone] and they will assist you.        Care EveryWhere ID     This is your Care  "EveryWhere ID. This could be used by other organizations to access your [place] medical records  [id number]        Your Vitals Were     Pulse Temperature Height Pulse Oximetry BMI (Body Mass Index)       101 98  F (36.7  C) (Oral) 5' 6.25\" (1.683 m) 96% 30.26 kg/m2        Blood Pressure from Last 3 Encounters:   08/16/18 124/80   07/26/18 138/88   04/03/18 132/80    Weight from Last 3 Encounters:   08/16/18 188 lb 14.4 oz (85.7 kg)   07/26/18 188 lb 14.4 oz (85.7 kg)   04/03/18 186 lb 9.6 oz (84.6 kg)              We Performed the Following     Basic metabolic panel  (Ca, Cl, CO2, Creat, Gluc, K, Na, BUN)     CBC with platelets     EKG 12-lead complete w/read - Clinics     EKG 12-lead complete w/read - Clinics        Primary Care Provider Office Phone # Fax #    Trisha Guerra [phone]       [facility] [place] [address]        Equal Access to Services     [facility] AH: Hadii chaim reed hadrosao Soshekhar, waaxda luqadaha, qaybta kaalmada lisa, harriet littlejohn . So [facility] [phone].    ATENCIÓN: Si habla español, tiene a alfred disposición servicios gratuitos de asistencia lingüística. [facility] [phone].    We comply with applicable federal civil rights laws and [place] laws. We do not discriminate on the basis of race, color, national origin, age, disability, sex, sexual orientation, or gender identity.            Thank you!     Thank you for choosing [facility]  for your care. Our goal is always to provide you with excellent care. Hearing back from our patients is one way we can continue to improve our services. Please take a few minutes to complete the written survey that you may receive in the mail after your visit with us. Thank you!             Your Updated Medication List - Protect others around you: Learn how to safely use, store and throw away your medicines at www.disposemymeds.org.          This list is accurate as of " 8/16/18  4:45 PM.  Always use your most recent med list.                   Brand Name Dispense Instructions for use Diagnosis    buPROPion 150 MG 24 hr tablet    WELLBUTRIN XL    90 tablet    Take 1 tablet (150 mg) by mouth daily every morning.    Moderate recurrent major depression (H), Benign hypertension, Menopause       cyclobenzaprine 10 MG tablet    FLEXERIL    90 tablet    Take 1 tablet (10 mg) by mouth nightly as needed for muscle spasms    DDD (degenerative disc disease), lumbar       ibuprofen 200 MG tablet    ADVIL/MOTRIN     Take 200 mg by mouth every 4 hours as needed.        * losartan 50 MG tablet    COZAAR    90 tablet    Take 1 tablet (50 mg) by mouth daily.    Benign hypertension, Moderate recurrent major depression (H), Menopause       * losartan 100 MG tablet    COZAAR    90 tablet    Take 1 tablet (100 mg) by mouth daily    Benign hypertension       norethindrone-ethinyl estradiol 1-5 MG-MCG per tablet    JINTELI    90 tablet    Take 1 tablet by mouth daily    Menopause, Benign hypertension, Moderate recurrent major depression (H)       triamterene-hydrochlorothiazide 37.5-25 MG per capsule    DYAZIDE     TK 1 C PO QD IN THE MORNING        zolpidem 10 MG tablet    AMBIEN    30 tablet    Take 1 tablet (10 mg) by mouth nightly as needed for sleep    Primary insomnia       * Notice:  This list has 2 medication(s) that are the same as other medications prescribed for you. Read the directions carefully, and ask your doctor or other care provider to review them with you.

## 2018-08-16 NOTE — TELEPHONE ENCOUNTER
Patient describes sudden onset of not feeling well yesterday.  Was tired in the am, but otherwise felt fine.  She had episode of syncope, and emesis.  Was pale.  911 was called and vitals were stable  Patient refused transport to a hospital.  Blood pressure-124/  Blood sugar -89.  Had eaten breakfast.  Had been on Triamterene for 4 days.  Has almost continuous cramping in her legs.  This didn't feel like a Meniere's episode-no dizziness with this.  Advised patient that she should be evaluated to try to determine cause of syncope.   May need labs drawn.  Appointment scheduled this afternoon.  Cautioned patient about driving, as episode yesterday came on fairly rapidly.  No further questions.  Will call back if any other questions or concerns.  ZOIE Ravi RN

## 2018-08-16 NOTE — PATIENT INSTRUCTIONS
VAS  Understanding Vasovagal Syncope  Vasovagal syncope is fainting caused by a complex nerve and blood vessel reaction in the body. It s the most common cause of fainting. Unlike other causes of fainting, it s not a sign of a problem with the heart or brain.     How to say it  [id number]  [id number]   How vasovagal syncope happens  Many nerves connect with your heart and blood vessels. These nerves help control the speed and force of your heartbeat. They also regulate blood pressure. They control whether your blood vessels should be more open or more closed.  Usually these nerves work together so you always get enough blood to your brain. In certain cases, these nerves may give a wrong signal. This may cause your blood vessels to open wide. At the same time, your heartbeat slows down. Blood can start to pool in your legs, and not enough of it may reach the brain. If that happens, you may briefly lose consciousness. When you lie down or fall down, blood flow to the brain resumes.  What causes vasovagal syncope?  Many triggers can cause vasovagal syncope, such as:    Standing for long periods    Too much heat    Intense emotion, such as fear    Intense pain    The sight of blood or a needle    Exercising for a long time  Older adults may have additional triggers, such as:    Urinating    Swallowing    Coughing    Having a bowel movement  Symptoms of vasovagal syncope  Fainting is the main symptom of vasovagal syncope. You may have symptoms before fainting such as:    Nausea    Warm, flushed feeling    Face that turns pale    Sweaty palms    Feeling dizzy    Blurred vision  If you lie down at the first sign of these symptoms, you will often be able to prevent fainting. Not everyone notices symptoms before fainting, however.  When a person does faint, lying down restores blood flow to the brain. Consciousness should return fairly quickly. You might not feel normal for a little while after you faint. You might feel  depressed or fatigued for a short time. You may even feel nauseous afterwards and vomit.  Some people have only 1 or 2 episodes of vasovagal syncope in their life. For others, it happens more often and with no warning.  Diagnosing vasovagal syncope  Your healthcare provider will ask about your health history and your symptoms. He or she will give you a physical exam. Your blood pressure may be measured while lying down, seated, and standing. You may also have an electrocardiogram (ECG). This is a simple test that looks at the heart s rhythm.  You may be checked for other possible causes of fainting. You may have other tests such as:    Continuous portable ECG monitoring, to look at heart rhythms over time, such as with a holter or event monitor    Echocardiogram, to look at the blood flow in the heart and the heart s motion    Exercise stress testing, to see how your heart does during exercise    Blood tests to check for signs of disease  If these tests are normal, you may have a tilt table test. For this test, you lie down on a platform. Your heart rate and blood pressure are measured while you are lying down. The platform is then tilted upright. Your heart rate and blood pressure are measured again. If you have vasovagal syncope, you may faint during the upward tilt. Sometimes medicines that increase heart rate and the force of heart contractions are used to try and provoke a syncopal episode.  There are many causes of syncope. Some causes are not dangerous. In older persons, unexplained syncope can be a sign of a serious infection or a heart attack. Call 911 or seek immediate medical attention to be evaluated, especially if there has been a fall and injury with the syncope. You should not drive yourself to the hospital or emergency department after a syncopal episode for the safety of yourself or other drivers and passengers. Have someone drive you. Your provider may restrict your driving until the cause of the  syncope is better understood and to make sure the syncope does not become chronic or if it is unpredictable.  Date Last Reviewed: 3/1/2017    5848-1313 [employer]. [address] [facility], [place] [address]. All rights reserved. This information is not intended as a substitute for professional medical care. Always follow your healthcare professional's instructions.        Fainting: Vagal Reaction  Fainting (syncope) is a temporary loss of consciousness that is associated with a loss of postural tone. It s also called passing out. It occurs when blood flow to the brain is less than normal. Your healthcare provider believes that your fainting was because of a vagal reaction. This condition is not a sign of serious disease.  A vagal reaction is a response in your body that causes your pulse to slow down or the blood vessels to expand. This causes your blood pressure to fall. And this sends less blood to your brain if you are standing or sitting. That results in dizziness, near-fainting, or fainting. Lying down usually stops the reaction within 60 seconds.  This response can occur during sudden fear, severe pain, emotional stress, overexertion, overheating, hunger, nausea or vomiting, prolonged standing, or standing up after sitting or lying for a long time.  Home care  Follow these guidelines when caring for yourself at home:    Rest today. Go back to your normal activities as soon as you are feeling back to normal.    Stay hydrated and avoid skipping meals.    If you feel lightheaded or dizzy, lie down right away. Or sit with your head lowered between your knees.  Follow-up care  Follow up with your healthcare provider, or as advised.  Call 911  Call 911 if any of the following occur:    Another fainting spell that s not explained by the common causes listed above    Pain in your chest, arm, neck, jaw, back, or abdomen    Shortness of breath    Severe headache or seizure    Your heart beats very rapidly,  very slowly, or irregularly (palpitations)  Date Last Reviewed: 12/1/2016 2000-2017 [employer] [address] [facility], [place] [address]. All rights reserved. This information is not intended as a substitute for professional medical care. Always follow your healthcare professional's instructions.

## 2018-08-16 NOTE — TELEPHONE ENCOUNTER
Reason for call:  Symptom   Symptom or request: vomiting and syncope    Duration (how long have symptoms been present): yesterday  Have you been treated for this before? No    Additional comments: Patient states that yesterday she experienced an episode of vomiting and one episode of syncope.  She is at work and currently asymptomatic.     Phone number to reach patient:  Home number on file [phone] (home)    Best Time:  any    Can we leave a detailed message on this number?  YES

## 2018-08-16 NOTE — PROGRESS NOTES
"Sitting at bar, told her partner she felt off, within a few seconds, sweaty pale,  then blacked  for \"at best 1 minute\"  Vomited during episode, again as coming to   No history of seizure    CVD none in self  - PVCs with stress test a few years ago  No stroke    Stroke paternal GM (elderly), maternal GF (elderly)    HX menieres, began triamterene causes leg cramps      SUBJECTIVE:  Lorena Sears is a 48 year old female who comes in for evaluation of near sycopal episode 1 day ago. Sitting at bar, told her partner she felt off, within a few seconds became sweaty pale,  then blacked  for \"at best 1 minute\" while being escorted away from the bar by her partner.  Vomited during episode, and once again as she was coming to.  No seizure like activity.     No history of seizure.  CVD none in self  - PVCs with stress test a few years ago. No stroke.     Stroke paternal GM (elderly), maternal GF (elderly)    HX menieres, began triamterene which causes leg cramps.         History reviewed. No pertinent past medical history.  Current Outpatient Prescriptions   Medication Sig Dispense Refill     buPROPion (WELLBUTRIN XL) 150 MG 24 hr tablet Take 1 tablet (150 mg) by mouth daily every morning. 90 tablet 3     cyclobenzaprine (FLEXERIL) 10 MG tablet Take 1 tablet (10 mg) by mouth nightly as needed for muscle spasms 90 tablet 3     ibuprofen (ADVIL,MOTRIN) 200 MG tablet Take 200 mg by mouth every 4 hours as needed.       losartan (COZAAR) 100 MG tablet Take 1 tablet (100 mg) by mouth daily 90 tablet 3     norethindrone-ethinyl estradiol (JINTELI) 1-5 MG-MCG per tablet Take 1 tablet by mouth daily 90 tablet 3     zolpidem (AMBIEN) 10 MG tablet Take 1 tablet (10 mg) by mouth nightly as needed for sleep 30 tablet 5     losartan (COZAAR) 50 MG tablet Take 1 tablet (50 mg) by mouth daily. (Patient not taking: Reported on 8/16/2018) 90 tablet 0     triamterene-hydrochlorothiazide (DYAZIDE) 37.5-25 MG per capsule TK 1 C PO QD IN THE " "MORNING  4     Social History   Substance Use Topics     Smoking status: Former Smoker     Types: Cigarettes     Quit date: 8/1/2011     Smokeless tobacco: Never Used     Alcohol use Yes      Comment: 1 drink a day       ROS:  Review of systems negative except as stated above.    OBJECTIVE:  /80 (BP Location: Right arm, Patient Position: Standing, Cuff Size: Adult Regular)  Pulse 101  Temp 98  F (36.7  C) (Oral)  Ht 5' 6.25\" (1.683 m)  Wt 188 lb 14.4 oz (85.7 kg)  SpO2 96%  BMI 30.26 kg/m2  GENERAL APPEARANCE: healthy, alert and no distress  EYES: conjunctiva clear  HENT: ear canals and TM's normal.  Nose and mouth without ulcers, erythema or lesions  NECK: supple, nontender, no lymphadenopathy  RESP: lungs clear to auscultation - no rales, rhonchi or wheezes  CV: regular rates and rhythm, normal S1 S2, no murmur noted  NEURO: Normal strength and tone, normal speech and mentation    Results for orders placed or performed in visit on 08/16/18   CBC with platelets   Result Value Ref Range    WBC 6.7 4.0 - 11.0 10e9/L    RBC Count 4.67 3.8 - 5.2 10e12/L    Hemoglobin 15.4 11.7 - 15.7 g/dL    Hematocrit 44.7 35.0 - 47.0 %    MCV 96 78 - 100 fl    MCH 33.0 26.5 - 33.0 pg    MCHC 34.5 31.5 - 36.5 g/dL    RDW 11.5 10.0 - 15.0 %    Platelet Count 242 150 - 450 10e9/L   Basic metabolic panel  (Ca, Cl, CO2, Creat, Gluc, K, Na, BUN)   Result Value Ref Range    Sodium 138 133 - 144 mmol/L    Potassium 4.3 3.4 - 5.3 mmol/L    Chloride 106 94 - 109 mmol/L    Carbon Dioxide 27 20 - 32 mmol/L    Anion Gap 5 3 - 14 mmol/L    Glucose 97 70 - 99 mg/dL    Urea Nitrogen 24 7 - 30 mg/dL    Creatinine 1.30 (H) 0.52 - 1.04 mg/dL    GFR Estimate 44 (L) >60 mL/min/1.7m2    GFR Estimate If Black 53 (L) >60 mL/min/1.7m2    Calcium 9.9 8.5 - 10.1 mg/dL     EKG: NSR    ASSESSMENT:  (R55) Syncope, unspecified syncope type  (primary encounter diagnosis)  Plan: EKG 12-lead complete w/read - Clinics, EKG         12-lead complete w/read - " Clinics      (R55) Vasovagal syncope  Plan: CBC with platelets, Basic metabolic panel  (Ca,        Cl, CO2, Creat, Gluc, K, Na, BUN)       Patient Instructions     VAS  Understanding Vasovagal Syncope  Vasovagal syncope is fainting caused by a complex nerve and blood vessel reaction in the body. It s the most common cause of fainting. Unlike other causes of fainting, it s not a sign of a problem with the heart or brain.     How to say it  [id number]  SINK-o-pee   How vasovagal syncope happens  Many nerves connect with your heart and blood vessels. These nerves help control the speed and force of your heartbeat. They also regulate blood pressure. They control whether your blood vessels should be more open or more closed.  Usually these nerves work together so you always get enough blood to your brain. In certain cases, these nerves may give a wrong signal. This may cause your blood vessels to open wide. At the same time, your heartbeat slows down. Blood can start to pool in your legs, and not enough of it may reach the brain. If that happens, you may briefly lose consciousness. When you lie down or fall down, blood flow to the brain resumes.  What causes vasovagal syncope?  Many triggers can cause vasovagal syncope, such as:    Standing for long periods    Too much heat    Intense emotion, such as fear    Intense pain    The sight of blood or a needle    Exercising for a long time  Older adults may have additional triggers, such as:    Urinating    Swallowing    Coughing    Having a bowel movement  Symptoms of vasovagal syncope  Fainting is the main symptom of vasovagal syncope. You may have symptoms before fainting such as:    Nausea    Warm, flushed feeling    Face that turns pale    Sweaty palms    Feeling dizzy    Blurred vision  If you lie down at the first sign of these symptoms, you will often be able to prevent fainting. Not everyone notices symptoms before fainting, however.  When a person does faint,  lying down restores blood flow to the brain. Consciousness should return fairly quickly. You might not feel normal for a little while after you faint. You might feel depressed or fatigued for a short time. You may even feel nauseous afterwards and vomit.  Some people have only 1 or 2 episodes of vasovagal syncope in their life. For others, it happens more often and with no warning.  Diagnosing vasovagal syncope  Your healthcare provider will ask about your health history and your symptoms. He or she will give you a physical exam. Your blood pressure may be measured while lying down, seated, and standing. You may also have an electrocardiogram (ECG). This is a simple test that looks at the heart s rhythm.  You may be checked for other possible causes of fainting. You may have other tests such as:    Continuous portable ECG monitoring, to look at heart rhythms over time, such as with a holter or event monitor    Echocardiogram, to look at the blood flow in the heart and the heart s motion    Exercise stress testing, to see how your heart does during exercise    Blood tests to check for signs of disease  If these tests are normal, you may have a tilt table test. For this test, you lie down on a platform. Your heart rate and blood pressure are measured while you are lying down. The platform is then tilted upright. Your heart rate and blood pressure are measured again. If you have vasovagal syncope, you may faint during the upward tilt. Sometimes medicines that increase heart rate and the force of heart contractions are used to try and provoke a syncopal episode.  There are many causes of syncope. Some causes are not dangerous. In older persons, unexplained syncope can be a sign of a serious infection or a heart attack. Call 911 or seek immediate medical attention to be evaluated, especially if there has been a fall and injury with the syncope. You should not drive yourself to the hospital or emergency department after a  syncopal episode for the safety of yourself or other drivers and passengers. Have someone drive you. Your provider may restrict your driving until the cause of the syncope is better understood and to make sure the syncope does not become chronic or if it is unpredictable.  Date Last Reviewed: 3/1/2017    7472-9233 [employer]. [address] [place] [address], [place] [address]. All rights reserved. This information is not intended as a substitute for professional medical care. Always follow your healthcare professional's instructions.        Fainting: Vagal Reaction  Fainting (syncope) is a temporary loss of consciousness that is associated with a loss of postural tone. It s also called passing out. It occurs when blood flow to the brain is less than normal. Your healthcare provider believes that your fainting was because of a vagal reaction. This condition is not a sign of serious disease.  A vagal reaction is a response in your body that causes your pulse to slow down or the blood vessels to expand. This causes your blood pressure to fall. And this sends less blood to your brain if you are standing or sitting. That results in dizziness, near-fainting, or fainting. Lying down usually stops the reaction within 60 seconds.  This response can occur during sudden fear, severe pain, emotional stress, overexertion, overheating, hunger, nausea or vomiting, prolonged standing, or standing up after sitting or lying for a long time.  Home care  Follow these guidelines when caring for yourself at home:    Rest today. Go back to your normal activities as soon as you are feeling back to normal.    Stay hydrated and avoid skipping meals.    If you feel lightheaded or dizzy, lie down right away. Or sit with your head lowered between your knees.  Follow-up care  Follow up with your healthcare provider, or as advised.  Call 911  Call 911 if any of the following occur:    Another fainting spell that s not explained by the  common causes listed above    Pain in your chest, arm, neck, jaw, back, or abdomen    Shortness of breath    Severe headache or seizure    Your heart beats very rapidly, very slowly, or irregularly (palpitations)  Date Last Reviewed: 12/1/2016 2000-2017 [employer]. [address] [place] [address]. All rights reserved. This information is not intended as a substitute for professional medical care. Always follow your healthcare professional's instructions.

## 2018-08-19 ENCOUNTER — TELEPHONE (OUTPATIENT)
Dept: PEDIATRICS | Facility: CLINIC | Age: 48
End: 2018-08-19

## 2018-08-19 NOTE — TELEPHONE ENCOUNTER
Reason for Call:  Other appointment    Detailed comments: Patient is request an appointment t with Trisha Guerra for elevated blood work. Patient stated she was told by urgent care to see her primary doctor. Please follow up with patient.     Phone Number Patient can be reached at: Home number on file [phone] (home)    Best Time: anytime     Can we leave a detailed message on this number? YES    Call taken on 8/19/2018 at 11:33 AM by Zandra Robbins

## 2018-08-19 NOTE — TELEPHONE ENCOUNTER
Clinic Action Needed:No/FYI only    Reason for Call: Lorena is returning call that she received from  staff over the weekend.  Advised Lorena that they were calling to notify her that her creatinine continues to be elevated and GFR is reduced.  Resembles her function from 4 years ago.  Push fluids and follow up with PCP this coming week.  Lorena appears to understand directives and agrees with plan.  Warm transferred to scheduling to make an appointment with PCP.     Routed to: [facility]    Carmelita Rocha RN  [facility]

## 2018-08-20 NOTE — TELEPHONE ENCOUNTER
Patient made an appointment for 8/24/18 with Dr.Serum. Solares, [facility]

## 2018-08-30 DIAGNOSIS — F33.1 MODERATE RECURRENT MAJOR DEPRESSION (H): ICD-10-CM

## 2018-08-30 DIAGNOSIS — Z78.0 MENOPAUSE: ICD-10-CM

## 2018-08-30 DIAGNOSIS — I10 BENIGN HYPERTENSION: ICD-10-CM

## 2018-08-30 LAB
ALBUMIN SERPL-MCNC: 3.7 G/DL (ref 3.4–5)
ALP SERPL-CCNC: 60 U/L (ref 40–150)
ALT SERPL W P-5'-P-CCNC: 41 U/L (ref 0–50)
ANION GAP SERPL CALCULATED.3IONS-SCNC: 9 MMOL/L (ref 3–14)
AST SERPL W P-5'-P-CCNC: 19 U/L (ref 0–45)
BILIRUB SERPL-MCNC: 0.5 MG/DL (ref 0.2–1.3)
BUN SERPL-MCNC: 12 MG/DL (ref 7–30)
CALCIUM SERPL-MCNC: 8.7 MG/DL (ref 8.5–10.1)
CHLORIDE SERPL-SCNC: 109 MMOL/L (ref 94–109)
CO2 SERPL-SCNC: 26 MMOL/L (ref 20–32)
CREAT SERPL-MCNC: 1.03 MG/DL (ref 0.52–1.04)
GFR SERPL CREATININE-BSD FRML MDRD: 57 ML/MIN/1.7M2
GLUCOSE SERPL-MCNC: 75 MG/DL (ref 70–99)
POTASSIUM SERPL-SCNC: 4.1 MMOL/L (ref 3.4–5.3)
PROT SERPL-MCNC: 7.2 G/DL (ref 6.8–8.8)
SODIUM SERPL-SCNC: 144 MMOL/L (ref 133–144)

## 2018-08-30 PROCEDURE — 80053 COMPREHEN METABOLIC PANEL: CPT | Performed by: INTERNAL MEDICINE

## 2018-08-30 PROCEDURE — 36415 COLL VENOUS BLD VENIPUNCTURE: CPT | Performed by: INTERNAL MEDICINE

## 2018-10-02 ENCOUNTER — TRANSFERRED RECORDS (OUTPATIENT)
Dept: HEALTH INFORMATION MANAGEMENT | Facility: CLINIC | Age: 48
End: 2018-10-02

## 2018-10-09 DIAGNOSIS — F51.01 PRIMARY INSOMNIA: ICD-10-CM

## 2018-10-09 RX ORDER — ZOLPIDEM TARTRATE 10 MG/1
TABLET ORAL
Qty: 30 TABLET | Refills: 0 | OUTPATIENT
Start: 2018-10-09

## 2018-10-09 NOTE — TELEPHONE ENCOUNTER
Requested Prescriptions   Pending Prescriptions Disp Refills     zolpidem (AMBIEN) 10 MG tablet [Pharmacy Med Name: ZOLPIDEM 10MG TABLETS]        Last Written Prescription Date:  4/4/2018  Last Fill Quantity: 30,   # refills: 5  Last Office Visit: 8/16/2018  Future Office visit:       Routing refill request to provider for review/approval because:  Drug not on the [facility], P or  Health refill protocol or controlled substance   30 tablet 0     Sig: TAKE 1 TABLET BY MOUTH EVERY NIGHT AT BEDTIME AS NEEDED FOR SLEEP    There is no refill protocol information for this order

## 2019-02-26 ENCOUNTER — TELEPHONE (OUTPATIENT)
Dept: PEDIATRICS | Facility: CLINIC | Age: 49
End: 2019-02-26

## 2019-02-26 NOTE — TELEPHONE ENCOUNTER
GFR was 57 on 08/30/18.  OK to use Advil, and if so-how much and for how long?  Patient reports that back pain flared up in the past two days.  Had to stand for prolonged periods of time two days in a row.  Getting up and sitting down is difficulty.  History of back issues for the past 12 years.  No radiation of pain.  No loss of bowel or bladder control.  No weakness in legs.  Pain is in mid low back and right side of her back.  Pain is constant, and at times rates as a 9/10.    2. [place] about one [place].  Will need an early refill of the Ambien on 03/06  Patient has refills available-ok to authorize and early refill of the Ambien?  ZOIE Ravi RN

## 2019-02-26 NOTE — TELEPHONE ENCOUNTER
Can take 600-800mg total daily with food for 7-14 days max.    Ok for early refill of ambien.  Trisha Guerra MD

## 2019-02-26 NOTE — TELEPHONE ENCOUNTER
Pharmacy notified of approval for an early refill of Ambien on 03/06.  Patient notified that this was approved and Dr. Guerra's recommendations for Advil use.  No further questions.  Will call back if any other questions or concerns.  ZOIE Ravi RN

## 2019-02-26 NOTE — TELEPHONE ENCOUNTER
Reason for call:  Other   Patient called regarding (reason for call): call back  Additional comments: Please call patient back regarding how much ibuprofen she can take for her back pain. She has kidney issues also and her back pain has flared up.    Phone number to reach patient:  Home number on file [phone] (home)    Best Time:  ASAP    Can we leave a detailed message on this number?  YES

## 2019-04-02 DIAGNOSIS — F51.01 PRIMARY INSOMNIA: ICD-10-CM

## 2019-04-02 NOTE — TELEPHONE ENCOUNTER
Requested Prescriptions   Pending Prescriptions Disp Refills     zolpidem (AMBIEN) 10 MG tablet [Pharmacy Med Name: ZOLPIDEM 10MG TABLETS] 30 tablet 0     Sig: TAKE 1 TABLET BY MOUTH NIGHTLY AS NEEDED FOR SLEEP    There is no refill protocol information for this order        zolpidem (AMBIEN) 10 MG tablet      Last Written Prescription Date:  10/09/2018  Last Fill Quantity: 30 tablet,   # refills: 5  Last Office Visit: 08/16/2018 Milton Lee PA-C   Future Office visit:       Routing refill request to provider for review/approval because:  Drug not on the [facility], P or [facility] refill protocol or controlled substance

## 2019-04-03 RX ORDER — ZOLPIDEM TARTRATE 10 MG/1
TABLET ORAL
Qty: 30 TABLET | Refills: 3 | Status: SHIPPED | OUTPATIENT
Start: 2019-04-03 | End: 2019-08-14

## 2019-08-11 DIAGNOSIS — F33.1 MODERATE RECURRENT MAJOR DEPRESSION (H): ICD-10-CM

## 2019-08-11 DIAGNOSIS — Z78.0 MENOPAUSE: ICD-10-CM

## 2019-08-11 DIAGNOSIS — I10 BENIGN HYPERTENSION: ICD-10-CM

## 2019-08-12 ENCOUNTER — MYC REFILL (OUTPATIENT)
Dept: PEDIATRICS | Facility: CLINIC | Age: 49
End: 2019-08-12

## 2019-08-12 DIAGNOSIS — F51.01 PRIMARY INSOMNIA: ICD-10-CM

## 2019-08-12 RX ORDER — ZOLPIDEM TARTRATE 10 MG/1
10 TABLET ORAL
Qty: 30 TABLET | Refills: 3 | Status: CANCELLED | OUTPATIENT
Start: 2019-08-12

## 2019-08-12 ASSESSMENT — ENCOUNTER SYMPTOMS
PARESTHESIAS: 0
FEVER: 0
ARTHRALGIAS: 1
WEAKNESS: 0
DYSURIA: 0
MYALGIAS: 0
COUGH: 0
ABDOMINAL PAIN: 0
PALPITATIONS: 0
FREQUENCY: 0
BREAST MASS: 0
CONSTIPATION: 0
CHILLS: 0
JOINT SWELLING: 0
EYE PAIN: 0
SHORTNESS OF BREATH: 0
SORE THROAT: 0
HEARTBURN: 0
HEADACHES: 0
NERVOUS/ANXIOUS: 0
HEMATOCHEZIA: 0
NAUSEA: 0
DIARRHEA: 0
DIZZINESS: 0
HEMATURIA: 0

## 2019-08-12 ASSESSMENT — PATIENT HEALTH QUESTIONNAIRE - PHQ9
SUM OF ALL RESPONSES TO PHQ QUESTIONS 1-9: 6
SUM OF ALL RESPONSES TO PHQ QUESTIONS 1-9: 6
10. IF YOU CHECKED OFF ANY PROBLEMS, HOW DIFFICULT HAVE THESE PROBLEMS MADE IT FOR YOU TO DO YOUR WORK, TAKE CARE OF THINGS AT HOME, OR GET ALONG WITH OTHER PEOPLE: SOMEWHAT DIFFICULT

## 2019-08-12 NOTE — TELEPHONE ENCOUNTER
"Requested Prescriptions   Pending Prescriptions Disp Refills     norethindrone-ethinyl estradiol (FEMHRT 1/5) 1-5 MG-MCG tablet [Pharmacy Med Name: NORETH/ETHIN 1MG-5MCG TABLETS]    Last Written Prescription Date:  7/26/2018  Last Fill Quantity: 90,  # refills: 3   Last office visit: 8/16/2018 with prescribing provider:  Trisha Guerra Office Visit:   Next 5 appointments (look out 90 days)    Aug 14, 2019 12:55 PM CDT  Adult physical with Trisha Guerra MD, EA EXAM ROOM 10  [facility] [address] [place] [address] [address]  [facility] [address] [phone]          84 tablet 0     Sig: TAKE 1 TABLET BY MOUTH EVERY DAY       Hormone Replacement Therapy Passed - 8/11/2019  9:12 PM        Passed - Blood pressure under 140/90 in past 12 months     BP Readings from Last 3 Encounters:   08/16/18 124/80   07/26/18 138/88   04/03/18 132/80                 Passed - Recent (12 mo) or future (30 days) visit within the authorizing provider's specialty     Patient had office visit in the last 12 months or has a visit in the next 30 days with authorizing provider or within the authorizing provider's specialty.  See \"Patient Info\" tab in inbasket, or \"Choose Columns\" in Meds & Orders section of the refill encounter.              Passed - Medication is active on med list        Passed - Patient is 18 years of age or older        Passed - No active pregnancy on record        Passed - No positive pregnancy test on record in past 12 months        losartan (COZAAR) 100 MG tablet [Pharmacy Med Name: LOSARTAN 100MG TABLETS]    Last Written Prescription Date:  7/26/2018  Last Fill Quantity: 90,  # refills: 3   Last office visit: 8/16/2018 with prescribing provider:  Trisha Guerra Office Visit:   Next 5 appointments (look out 90 days)    Aug 14, 2019 12:55 PM CDT  Adult physical with Trisha Guerra MD, EA EXAM ROOM 10  [facility] " "[place] ([facility] [place]) [address]  [address]  [place] [address] [phone]          90 tablet 0     Sig: TAKE 1 TABLET BY MOUTH EVERY DAY       Angiotensin-II Receptors Passed - 8/11/2019  9:12 PM        Passed - Last blood pressure under 140/90 in past 12 months     BP Readings from Last 3 Encounters:   08/16/18 124/80   07/26/18 138/88   04/03/18 132/80                 Passed - Recent (12 mo) or future (30 days) visit within the authorizing provider's specialty     Patient had office visit in the last 12 months or has a visit in the next 30 days with authorizing provider or within the authorizing provider's specialty.  See \"Patient Info\" tab in inbasket, or \"Choose Columns\" in Meds & Orders section of the refill encounter.              Passed - Medication is active on med list        Passed - Patient is age 18 or older        Passed - No active pregnancy on record        Passed - Normal serum creatinine on file in past 12 months     Recent Labs   Lab Test 08/30/18  0937   CR 1.03             Passed - Normal serum potassium on file in past 12 months     Recent Labs   Lab Test 08/30/18  0937   POTASSIUM 4.1                    Passed - No positive pregnancy test in past 12 months          "

## 2019-08-13 ASSESSMENT — PATIENT HEALTH QUESTIONNAIRE - PHQ9: SUM OF ALL RESPONSES TO PHQ QUESTIONS 1-9: 6

## 2019-08-13 NOTE — TELEPHONE ENCOUNTER
"Pt has appointment scheduled with PCP tomorrow (8/14/19).     RN unable to provide sera refill due to override required. Please review and override if appropriate:  \"Drug-Drug: triamterene-HCTZ and losartan   The risk of hyperkalemia may be increased when potassium-sparing diuretics are co-administered with angiotensin II receptor antagonists.\"    YOLY López, RN    "

## 2019-08-14 ENCOUNTER — ANCILLARY PROCEDURE (OUTPATIENT)
Dept: MAMMOGRAPHY | Facility: CLINIC | Age: 49
End: 2019-08-14
Attending: INTERNAL MEDICINE
Payer: COMMERCIAL

## 2019-08-14 ENCOUNTER — OFFICE VISIT (OUTPATIENT)
Dept: PEDIATRICS | Facility: CLINIC | Age: 49
End: 2019-08-14
Payer: COMMERCIAL

## 2019-08-14 VITALS
HEIGHT: 66 IN | OXYGEN SATURATION: 97 % | BODY MASS INDEX: 32.69 KG/M2 | TEMPERATURE: 97.9 F | SYSTOLIC BLOOD PRESSURE: 138 MMHG | WEIGHT: 203.4 LBS | DIASTOLIC BLOOD PRESSURE: 82 MMHG | HEART RATE: 84 BPM

## 2019-08-14 DIAGNOSIS — G44.219 EPISODIC TENSION-TYPE HEADACHE, NOT INTRACTABLE: ICD-10-CM

## 2019-08-14 DIAGNOSIS — F51.01 PRIMARY INSOMNIA: ICD-10-CM

## 2019-08-14 DIAGNOSIS — E66.9 NON MORBID OBESITY, UNSPECIFIED OBESITY TYPE: ICD-10-CM

## 2019-08-14 DIAGNOSIS — I10 BENIGN HYPERTENSION: ICD-10-CM

## 2019-08-14 DIAGNOSIS — Z78.0 MENOPAUSE: ICD-10-CM

## 2019-08-14 DIAGNOSIS — Z12.31 VISIT FOR SCREENING MAMMOGRAM: ICD-10-CM

## 2019-08-14 DIAGNOSIS — Z00.00 ROUTINE GENERAL MEDICAL EXAMINATION AT A HEALTH CARE FACILITY: Primary | ICD-10-CM

## 2019-08-14 DIAGNOSIS — M51.369 DDD (DEGENERATIVE DISC DISEASE), LUMBAR: ICD-10-CM

## 2019-08-14 DIAGNOSIS — F33.1 MODERATE RECURRENT MAJOR DEPRESSION (H): ICD-10-CM

## 2019-08-14 DIAGNOSIS — N18.30 CKD (CHRONIC KIDNEY DISEASE) STAGE 3, GFR 30-59 ML/MIN (H): ICD-10-CM

## 2019-08-14 PROCEDURE — 77067 SCR MAMMO BI INCL CAD: CPT | Mod: TC

## 2019-08-14 PROCEDURE — 80048 BASIC METABOLIC PNL TOTAL CA: CPT | Performed by: INTERNAL MEDICINE

## 2019-08-14 PROCEDURE — 99214 OFFICE O/P EST MOD 30 MIN: CPT | Mod: 25 | Performed by: INTERNAL MEDICINE

## 2019-08-14 PROCEDURE — 82043 UR ALBUMIN QUANTITATIVE: CPT | Performed by: INTERNAL MEDICINE

## 2019-08-14 PROCEDURE — 99396 PREV VISIT EST AGE 40-64: CPT | Performed by: INTERNAL MEDICINE

## 2019-08-14 PROCEDURE — 36415 COLL VENOUS BLD VENIPUNCTURE: CPT | Performed by: INTERNAL MEDICINE

## 2019-08-14 PROCEDURE — 77063 BREAST TOMOSYNTHESIS BI: CPT | Mod: TC

## 2019-08-14 RX ORDER — LOSARTAN POTASSIUM 100 MG/1
100 TABLET ORAL DAILY
Qty: 90 TABLET | Refills: 3 | Status: SHIPPED | OUTPATIENT
Start: 2019-08-14

## 2019-08-14 RX ORDER — NORETHINDRONE ACETATE AND ETHINYL ESTRADIOL 1; 5 MG/1; UG/1
1 TABLET ORAL DAILY
Qty: 90 TABLET | Refills: 3 | Status: SHIPPED | OUTPATIENT
Start: 2019-08-14

## 2019-08-14 RX ORDER — BUPROPION HYDROCHLORIDE 150 MG/1
TABLET ORAL
Qty: 90 TABLET | Refills: 3 | Status: CANCELLED | OUTPATIENT
Start: 2019-08-14

## 2019-08-14 RX ORDER — NORETHINDRONE ACETATE AND ETHINYL ESTRADIOL 1; 5 MG/1; UG/1
TABLET ORAL
Qty: 84 TABLET | Refills: 0 | OUTPATIENT
Start: 2019-08-14

## 2019-08-14 RX ORDER — ZOLPIDEM TARTRATE 10 MG/1
10 TABLET ORAL
Qty: 30 TABLET | Refills: 5 | Status: SHIPPED | OUTPATIENT
Start: 2019-08-14 | End: 2020-02-18

## 2019-08-14 RX ORDER — LOSARTAN POTASSIUM 100 MG/1
TABLET ORAL
Qty: 90 TABLET | Refills: 0 | OUTPATIENT
Start: 2019-08-14

## 2019-08-14 RX ORDER — TRIAMTERENE AND HYDROCHLOROTHIAZIDE 37.5; 25 MG/1; MG/1
1 CAPSULE ORAL DAILY
Qty: 90 CAPSULE | Refills: 3 | Status: CANCELLED | OUTPATIENT
Start: 2019-08-14

## 2019-08-14 RX ORDER — CYCLOBENZAPRINE HCL 10 MG
10 TABLET ORAL
Qty: 90 TABLET | Refills: 3 | Status: SHIPPED | OUTPATIENT
Start: 2019-08-14

## 2019-08-14 ASSESSMENT — ENCOUNTER SYMPTOMS
ABDOMINAL PAIN: 0
FREQUENCY: 0
HEARTBURN: 0
NERVOUS/ANXIOUS: 0
JOINT SWELLING: 0
DIZZINESS: 0
ARTHRALGIAS: 1
HEMATOCHEZIA: 0
WEAKNESS: 0
FEVER: 0
SHORTNESS OF BREATH: 0
BREAST MASS: 0
MYALGIAS: 0
DIARRHEA: 0
CONSTIPATION: 0
HEADACHES: 0
SORE THROAT: 0
PARESTHESIAS: 0
DYSURIA: 0
PALPITATIONS: 0
HEMATURIA: 0
CHILLS: 0
COUGH: 0
EYE PAIN: 0
NAUSEA: 0

## 2019-08-14 ASSESSMENT — ANXIETY QUESTIONNAIRES
7. FEELING AFRAID AS IF SOMETHING AWFUL MIGHT HAPPEN: NOT AT ALL
IF YOU CHECKED OFF ANY PROBLEMS ON THIS QUESTIONNAIRE, HOW DIFFICULT HAVE THESE PROBLEMS MADE IT FOR YOU TO DO YOUR WORK, TAKE CARE OF THINGS AT HOME, OR GET ALONG WITH OTHER PEOPLE: NOT DIFFICULT AT ALL
5. BEING SO RESTLESS THAT IT IS HARD TO SIT STILL: NOT AT ALL
2. NOT BEING ABLE TO STOP OR CONTROL WORRYING: SEVERAL DAYS
6. BECOMING EASILY ANNOYED OR IRRITABLE: MORE THAN HALF THE DAYS
1. FEELING NERVOUS, ANXIOUS, OR ON EDGE: SEVERAL DAYS
GAD7 TOTAL SCORE: 7
3. WORRYING TOO MUCH ABOUT DIFFERENT THINGS: MORE THAN HALF THE DAYS

## 2019-08-14 ASSESSMENT — MIFFLIN-ST. JEOR: SCORE: 1564.37

## 2019-08-14 ASSESSMENT — PATIENT HEALTH QUESTIONNAIRE - PHQ9: 5. POOR APPETITE OR OVEREATING: SEVERAL DAYS

## 2019-08-14 NOTE — TELEPHONE ENCOUNTER
noe      Last Written Prescription Date:  4/3/19  Last Fill Quantity: 30,   # refills: 3  Last Office Visit: 8/14/19  Future Office visit:    Next 5 appointments (look out 90 days)    Aug 14, 2019 12:55 PM CDT  Adult physical with Trisha Guerra MD,  EXAM ROOM 10  [facility] [address] [place] [id number] [phone]           Routing refill request to provider for review/approval because:  Drug not on the [facility], [facility] or  Health refill protocol or controlled substance

## 2019-08-14 NOTE — PROGRESS NOTES
SUBJECTIVE:   CC: Lorena Sears is an 49 year old woman who presents for preventive health visit.     Healthy Habits:     Getting at least 3 servings of Calcium per day:  Yes    Bi-annual eye exam:  Yes    Dental care twice a year:  Yes    Sleep apnea or symptoms of sleep apnea:  None    Diet:  Regular (no restrictions)    Frequency of exercise:  2-3 days/week    Duration of exercise:  15-30 minutes    Taking medications regularly:  Yes    Medication side effects:  None    PHQ-2 Total Score: 3    Additional concerns today:  No      meniers is progressing, 70% hearing loss in right ear.  Is making it hard to hear conversations.      Is not checking bp at home.  No problems with medications.      Doesn't feel that wellbutrin is helping.  Is interested in stopping medication.     Headaches controlled by regular massage and drinking enough water.  Trying to decrease ibuprofen use due to CKD.           Today's PHQ-2 Score:   PHQ-2 (  [employer]) 2019   Q1: Little interest or pleasure in doing things 2   Q2: Feeling down, depressed or hopeless 1   PHQ-2 Score 3   Q1: Little interest or pleasure in doing things More than half the days   Q2: Feeling down, depressed or hopeless Several days   PHQ-2 Score 3       Abuse: Current or Past(Physical, Sexual or Emotional)- No  Do you feel safe in your environment? Yes    Social History     Tobacco Use     Smoking status: Former Smoker     Types: Cigarettes     Last attempt to quit: 2011     Years since quittin.0     Smokeless tobacco: Never Used   Substance Use Topics     Alcohol use: Yes     Frequency: 2-3 times a week     Drinks per session: 1 or 2     Binge frequency: Less than monthly     Comment: 1 drink a day     If you drink alcohol do you typically have >3 drinks per day or >7 drinks per week? No    Alcohol Use 2019   Prescreen: >3 drinks/day or >7 drinks/week? No   Prescreen: >3 drinks/day or >7 drinks/week? -       Reviewed orders with patient.   Reviewed health maintenance and updated orders accordingly - Yes  BP Readings from Last 3 Encounters:   18 124/80   18 138/88   18 132/80    Wt Readings from Last 3 Encounters:   18 85.7 kg (188 lb 14.4 oz)   18 85.7 kg (188 lb 14.4 oz)   18 84.6 kg (186 lb 9.6 oz)                  Patient Active Problem List   Diagnosis     CARDIOVASCULAR SCREENING; LDL GOAL LESS THAN 160     Moderate recurrent major depression (H)     PVC (premature ventricular contraction)     DDD (degenerative disc disease), lumbar     Benign hypertension     Neck Pain     Menopause     Episodic tension-type headache, not intractable     Active cochlear Meniere's disease, right     Non morbid obesity, unspecified obesity type     Past Surgical History:   Procedure Laterality Date     WRIST SURGERY      cyst removal on right wrist       Social History     Tobacco Use     Smoking status: Former Smoker     Types: Cigarettes     Last attempt to quit: 2011     Years since quittin.0     Smokeless tobacco: Never Used   Substance Use Topics     Alcohol use: Yes     Frequency: 2-3 times a week     Drinks per session: 1 or 2     Binge frequency: Less than monthly     Comment: 1 drink a day     Family History   Problem Relation Age of Onset     Hypertension Mother      Thyroid Disease Mother      Hypertension Father      Cancer Father         non Hodgkin lymphoma./waldenstroms     Breast Cancer Maternal Grandmother         older at diagnosis     Alzheimer Disease Maternal Grandfather      Neurologic Disorder Maternal Grandfather         parkinsons     Cancer Maternal Grandfather      Alzheimer Disease Paternal Grandmother      Cancer Paternal Grandfather      Breast Cancer Maternal Aunt 50         Current Outpatient Medications   Medication Sig Dispense Refill     buPROPion (WELLBUTRIN XL) 150 MG 24 hr tablet Take 1 tablet (150 mg) by mouth daily every morning. 90 tablet 3     cyclobenzaprine (FLEXERIL) 10 MG  tablet Take 1 tablet (10 mg) by mouth nightly as needed for muscle spasms 90 tablet 3     ibuprofen (ADVIL,MOTRIN) 200 MG tablet Take 200 mg by mouth every 4 hours as needed.       losartan (COZAAR) 100 MG tablet Take 1 tablet (100 mg) by mouth daily 90 tablet 3     norethindrone-ethinyl estradiol (JINTELI) 1-5 MG-MCG per tablet Take 1 tablet by mouth daily 90 tablet 3     triamterene-hydrochlorothiazide (DYAZIDE) 37.5-25 MG per capsule TK 1 C PO QD IN THE MORNING  4     zolpidem (AMBIEN) 10 MG tablet TAKE 1 TABLET BY MOUTH NIGHTLY AS NEEDED FOR SLEEP 30 tablet 3           Pertinent mammograms are reviewed under the imaging tab.  History of abnormal Pap smear: NO - age 30-65 PAP every 5 years with negative HPV co-testing recommended  PAP / HPV Latest Ref Rng & Units 3/19/2015 2/14/2012   PAP - NIL NIL   HPV 16 DNA NEG Negative -   HPV 18 DNA NEG Negative -   OTHER HR HPV NEG Negative -     Reviewed and updated as needed this visit by clinical staff         Reviewed and updated as needed this visit by Provider        No past medical history on file.   Past Surgical History:   Procedure Laterality Date     WRIST SURGERY      cyst removal on right wrist       Review of Systems   Constitutional: Negative for chills and fever.   HENT: Positive for hearing loss. Negative for congestion, ear pain and sore throat.    Eyes: Negative for pain and visual disturbance.   Respiratory: Negative for cough and shortness of breath.    Cardiovascular: Negative for chest pain, palpitations and peripheral edema.   Gastrointestinal: Negative for abdominal pain, constipation, diarrhea, heartburn, hematochezia and nausea.   Breasts:  Negative for tenderness, breast mass and discharge.   Genitourinary: Negative for dysuria, frequency, genital sores, hematuria, pelvic pain, urgency, vaginal bleeding and vaginal discharge.   Musculoskeletal: Positive for arthralgias. Negative for joint swelling and myalgias.   Skin: Negative for rash.  "  Neurological: Negative for dizziness, weakness, headaches and paresthesias.   Psychiatric/Behavioral: Negative for mood changes. The patient is not nervous/anxious.           OBJECTIVE:   BP (!) 122/90 (BP Location: Right arm, Patient Position: Chair, Cuff Size: Adult Large)   Pulse 84   Temp 97.9  F (36.6  C) (Oral)   Ht 1.676 m (5' 6\")   Wt 92.3 kg (203 lb 6.4 oz)   SpO2 97%   BMI 32.83 kg/m    Physical Exam  GENERAL:, alert and no distress  EYES: Eyes grossly normal to inspection, PERRL and conjunctivae and sclerae normal  HENT: ear canals and TM's normal, nose and mouth without ulcers or lesions  NECK: no adenopathy, no asymmetry, masses, or scars and thyroid normal to palpation  RESP: lungs clear to auscultation - no rales, rhonchi or wheezes  BREAST: normal without masses, tenderness or nipple discharge and no palpable axillary masses or adenopathy  CV: regular rate and rhythm, normal S1 S2, no S3 or S4, no murmur, click or rub, no peripheral edema and peripheral pulses strong  ABDOMEN: soft, nontender,  and bowel sounds normal  MS: no gross musculoskeletal defects noted, no edema  SKIN: no suspicious lesions or rashes  NEURO: Normal strength and tone, mentation intact and speech normal  PSYCH: mentation appears normal, affect normal/bright    Diagnostic Test Results:  none     ASSESSMENT/PLAN:   (Z00.00) Routine general medical examination at a health care facility  (primary encounter diagnosis)  -mammo today  -colonoscopy starts next year      (I10) Benign hypertension  -bp borderline today, encouraged her to check bp at home 2-3 x weekly and let me know if over 140/90  Plan: losartan (COZAAR) 100 MG tablet,         norethindrone-ethinyl estradiol (JINTELI) 1-5         MG-MCG tablet, Basic metabolic panel, Albumin      Random Urine Quantitative with Creat Ratio      (N18.3) CKD (chronic kidney disease) stage 3, GFR 30-59 ml/min (H)  -continue to avoid nsaids  -off diuretics  -check labs today   Plan: " "Basic metabolic panel, Albumin Random Urine         Quantitative with Creat Ratio       (F33.1) Moderate recurrent major depression (H)  -pt on wellbutrin but would like to stop med  -ok to stop wellbutrin, monitor symptoms for 2-3 months       (G44.219) Episodic tension-type headache, not intractable  -controlled with regular massage and managing fluid intake     (M51.36) DDD (degenerative disc disease), lumbar  -takes as needed   Plan: cyclobenzaprine (FLEXERIL) 10 MG tablet          (F51.01) Primary insomnia  -shift work sleep disorder  -has tried lower dose ambien without success  -when patient stops working will try to wean off medication   Plan: zolpidem (AMBIEN) 10 MG tablet          (E66.9) Non morbid obesity, unspecified obesity type  -continue to work on diet and regular exercise     (Z78.0) Menopause  -pt would like to come off med  -will come off wellbutrin first, and then in 3 months can stop ocp's   Plan: norethindrone-ethinyl estradiol (JINTELI) 1-5         MG-MCG tablet        COUNSELING:  Reviewed preventive health counseling, as reflected in patient instructions       Regular exercise       Healthy diet/nutrition       Vision screening       Hearing screening       (Peggy)menopause management    Estimated body mass index is 30.26 kg/m  as calculated from the following:    Height as of 8/16/18: 1.683 m (5' 6.25\").    Weight as of 8/16/18: 85.7 kg (188 lb 14.4 oz).    Weight management plan: Discussed healthy diet and exercise guidelines     reports that she quit smoking about 8 years ago. Her smoking use included cigarettes. She has never used smokeless tobacco.      Counseling Resources:  ATP IV Guidelines  Pooled Cohorts Equation Calculator  Breast Cancer Risk Calculator  FRAX Risk Assessment  ICSI Preventive Guidelines  Dietary Guidelines for Americans, 2010  USDA's MyPlate  ASA Prophylaxis  Lung CA Screening    Trisha Guerra MD  [facility]  Answers for HPI/ROS submitted by " the patient on 8/12/2019   Annual Exam:  If you checked off any problems, how difficult have these problems made it for you to do your work, take care of things at home, or get along with other people?: Somewhat difficult  PHQ9 TOTAL SCORE: 6

## 2019-08-15 DIAGNOSIS — F51.01 PRIMARY INSOMNIA: ICD-10-CM

## 2019-08-15 LAB
ANION GAP SERPL CALCULATED.3IONS-SCNC: 6 MMOL/L (ref 3–14)
BUN SERPL-MCNC: 15 MG/DL (ref 7–30)
CALCIUM SERPL-MCNC: 9.6 MG/DL (ref 8.5–10.1)
CHLORIDE SERPL-SCNC: 109 MMOL/L (ref 94–109)
CO2 SERPL-SCNC: 25 MMOL/L (ref 20–32)
CREAT SERPL-MCNC: 0.96 MG/DL (ref 0.52–1.04)
CREAT UR-MCNC: 20 MG/DL
GFR SERPL CREATININE-BSD FRML MDRD: 69 ML/MIN/{1.73_M2}
GLUCOSE SERPL-MCNC: 81 MG/DL (ref 70–99)
MICROALBUMIN UR-MCNC: <5 MG/L
MICROALBUMIN/CREAT UR: NORMAL MG/G CR (ref 0–25)
POTASSIUM SERPL-SCNC: 4.6 MMOL/L (ref 3.4–5.3)
SODIUM SERPL-SCNC: 140 MMOL/L (ref 133–144)

## 2019-08-15 ASSESSMENT — ANXIETY QUESTIONNAIRES: GAD7 TOTAL SCORE: 7

## 2019-08-21 RX ORDER — ZOLPIDEM TARTRATE 10 MG/1
TABLET ORAL
Qty: 30 TABLET | Refills: 0
Start: 2019-08-21

## 2019-10-08 ENCOUNTER — TRANSFERRED RECORDS (OUTPATIENT)
Dept: HEALTH INFORMATION MANAGEMENT | Facility: CLINIC | Age: 49
End: 2019-10-08

## 2019-11-08 DIAGNOSIS — M51.369 DDD (DEGENERATIVE DISC DISEASE), LUMBAR: ICD-10-CM

## 2019-11-09 NOTE — TELEPHONE ENCOUNTER
Requested Prescriptions   Pending Prescriptions Disp Refills     cyclobenzaprine (FLEXERIL) 10 MG tablet [Pharmacy Med Name: CYCLOBENZAPRINE 10MG TABLETS]  Last Written Prescription Date:  08/14/2019  Last Fill Quantity: 90 tablet,  # refills: 3   Last Office Visit: 8/14/2019 Trisha Guerra MD   Future Office Visit:      90 tablet 0     Sig: TAKE 1 TABLET BY MOUTH NIGHTLY AS NEEDED FOR MUSCLE SPASMS       There is no refill protocol information for this order

## 2019-11-11 RX ORDER — CYCLOBENZAPRINE HCL 10 MG
TABLET ORAL
Qty: 90 TABLET | Refills: 0 | OUTPATIENT
Start: 2019-11-11

## 2019-11-11 NOTE — TELEPHONE ENCOUNTER
Patient should have refills. Please call pharmacy and see if they are available for patient    Thanks,  Mattie Noonan MD  Internal Medicine/Pediatrics

## 2019-11-11 NOTE — TELEPHONE ENCOUNTER
Called pharmacy- patient does have refills left. Pharmacist told me to disregard prescription request for cyclobenzaprine (FLEXERIL) 10 mg tablet.     Angelika Lopez MA

## 2020-11-17 ENCOUNTER — MYC MEDICAL ADVICE (OUTPATIENT)
Dept: PEDIATRICS | Facility: CLINIC | Age: 50
End: 2020-11-17

## 2020-11-19 ENCOUNTER — AMBULATORY - HEALTHEAST (OUTPATIENT)
Dept: ADMINISTRATIVE | Facility: REHABILITATION | Age: 50
End: 2020-11-19

## 2020-11-19 DIAGNOSIS — M54.50 LOW BACK PAIN, UNSPECIFIED BACK PAIN LATERALITY, UNSPECIFIED CHRONICITY, UNSPECIFIED WHETHER SCIATICA PRESENT: ICD-10-CM

## 2020-12-14 ENCOUNTER — OFFICE VISIT - HEALTHEAST (OUTPATIENT)
Dept: PHYSICAL THERAPY | Facility: REHABILITATION | Age: 50
End: 2020-12-14

## 2020-12-14 DIAGNOSIS — M62.81 GENERALIZED MUSCLE WEAKNESS: ICD-10-CM

## 2020-12-14 DIAGNOSIS — M54.50 ACUTE BILATERAL LOW BACK PAIN WITHOUT SCIATICA: ICD-10-CM

## 2021-01-09 ENCOUNTER — HEALTH MAINTENANCE LETTER (OUTPATIENT)
Age: 51
End: 2021-01-09

## 2021-02-16 DIAGNOSIS — M51.369 DDD (DEGENERATIVE DISC DISEASE), LUMBAR: ICD-10-CM

## 2021-02-17 RX ORDER — CYCLOBENZAPRINE HCL 10 MG
10 TABLET ORAL
Qty: 90 TABLET | Refills: 3 | Status: CANCELLED | OUTPATIENT
Start: 2021-02-17

## 2021-02-17 NOTE — TELEPHONE ENCOUNTER
Called and spoke with the patient.  She is now at [facility] and will no longer need refills from our clinic.     Carmelita Bustillo

## 2021-03-13 ENCOUNTER — HEALTH MAINTENANCE LETTER (OUTPATIENT)
Age: 51
End: 2021-03-13

## 2021-06-11 NOTE — PROGRESS NOTES
[facility] Daily Progress     Patient Name: Lorena Sears  Date: 2017  Visit #: 3  Referring provider: Trisha Guerra MD  Visit Diagnosis:     ICD-10-CM    1. Acute pain of left shoulder M25.512    2. Shoulder impingement, left M75.42    3. Poor posture R29.3    4. Shoulder weakness M62.81    5. Decreased ROM of left shoulder M25.612          Assessment:     HEP/POC compliance is  good .  Patient demonstrates understanding/independence with home program.  Patient is benefitting from skilled physical therapy and is making steady progress toward functional goals.    Goal Status:  Pt. will demonstrate/verbalize independence in self-management of condition in : 6 weeks  Pt. will improve posture : and demonstrate posture with minimal to no cuing;in 6 weeks  Patient will decrease : SPADI score;for improved quality of function;in 6 weeks  Pt will: be able to reach behind her back and overhead with increased ease and pain <2/10; in 6 weeks     Plan / Patient Education:     Continue with initial plan of care.  Progress with home program as tolerated.     ADD ER/IR strength - bands?    Subjective:     Pain Ratin     Pt reports that things are going well overall with lessening pain.  Pt reports that her primary action for increased pain is reaching behind her back, but she will still get some pain with some movements.      Pt reports that she overdid it in the gym the other day and is feeling sore from that and then did not ice.      Pt notes that she did her exercises most days.      Objective:     Pt tolerates the exercises the exercises with moderate cueing.      Pt with minimally limited ROM and pain with end range flexion and IR.      Pt with decreased left shoulder strength and stability.      Pt with increased left UT and general shoulder tightness and tenderness.  This did decreased with MT.      Current Exercises:  Exercise #1: UBE   Comment #1: x3 min   Exercise #2: Gas pedal stretch  "  Comment #2: L x30\"  Exercise #3: Scap retract   Comment #3: x5 with 5\"  Exercise #4: shoulder IR with cane   Comment #4: Bx5   Exercise #5: Bent over Shoulder \"I\" and \"T\"  Comment #5: Lx20 I and Lx10 T - added to HEP   Exercise #6: shoulder band - extension and flexion   Comment #6: orange Bx10 each - added to HEP       Treatment Today     TREATMENT MINUTES COMMENTS   Evaluation     Self-care/ Home management     Manual therapy 12 STM to left shoulder and UT and TPR into the left axilla (subscapularis) in supine   STM and TPR to left post shoulder muscles in S/L    Neuromuscular Re-education     Therapeutic Activity     Therapeutic Exercises 16 See flow sheet    Gait training     Modality__________________                Total 28    Blank areas are intentional and mean the treatment did not include these items.       Dariela Bustillo, PT   7/11/2017    "

## 2021-06-11 NOTE — PROGRESS NOTES
[facility]   Shoulder Initial Evaluation    Patient Name: Lorena Sears  Date of evaluation: 6/27/2017  Referral Diagnosis: Acute pain of left shoulder  Referring provider: Trisha Guerra MD  Visit Diagnosis:     ICD-10-CM    1. Acute pain of left shoulder M25.512    2. Shoulder impingement, left M75.42    3. Poor posture R29.3    4. Shoulder weakness M62.81    5. Decreased ROM of left shoulder M25.612        Assessment:      Pt. is appropriate for skilled PT intervention as outlined in the Plan of Care (POC).    Goals:  Pt. will demonstrate/verbalize independence in self-management of condition in : 6 weeks  Pt. will improve posture : and demonstrate posture with minimal to no cuing;in 6 weeks  Patient will decrease : SPADI score;for improved quality of function;in 6 weeks  Pt will: be able to reach behind her back and overhead with increased ease and pain <2/10; in 6 weeks     Patient's expectations/goals are realistic.    Barriers to Learning or Achieving Goals:  No Barriers.       Plan / Patient Instructions:        Plan of Care:   Communication with: Referral Source  Patient Related Instruction: Nature of Condition;Treatment plan and rationale;Self Care instruction;Basis of treatment;Body mechanics;Posture;Expected outcome  Times per Week: 2  Number of Weeks: 6  Number of Visits: 12 - PRN   Discharge Planning: Pt will be discharged upon meeting goals or plateau of progress   Therapeutic Exercise: ROM;Stretching;Strengthening  Neuromuscular Reeducation: kinesio tape;posture  Manual Therapy: soft tissue mobilization;myofascial release;joint mobilization;muscle energy;strain counterstrain  Modalities: electrical stimulation;TENS;ultrasound;cold pack;hot pack    POC and pathology of condition were reviewed with patient.  Pt. is in agreement with the Plan of Care  A Home Exercise Program (HEP) was initiated today.  Pt. was instructed in exercises by PT and patient was given a handout with detailed  instructions.  Plan for next visit: Focus on strength and MT   .   Subjective:         History of Present Illness:    Lorena is a 47 y.o. female who presents to therapy today with complaints of left shoulder pain. Date of onset/duration of symptoms is 2017. Onset was gradual. Symptoms are intermittent. She denies history of similar symptoms. She describes their previous level of function as not limited     Pain mostly occurs with reaching behind the back, a little with some overhead ER or rotational movement and sometimes with even pushing up in a chair etc.     Pt reports that her pain feels very deep and isolated in the shoulder     Pain Ratin  Pain rating at best: 0  Pain rating at worst: 10  Pain description: pain, sharp and shooting    Functional limitations are described as occurring with:   reaching overhead and behind back  sleeping is S/L     Patient reports that she has not done ice/heat etc.        Objective:      Note: Items left blank indicates the item was not performed or not indicated at the time of the evaluation.    Patient Outcome Measures :    Shoulder Pain and Disability Index (SPADI) in %: 32   Scores range from 0-100%, where a score of 0% represents minimal pain and maximal function. The minimal clincically important difference is a score reduction of 10%.    Shoulder Examination  1. Acute pain of left shoulder     2. Shoulder impingement, left     3. Poor posture     4. Shoulder weakness     5. Decreased ROM of left shoulder       Involved side: Left  Posture Observation:      General sitting posture is  fair.  Cervical Clearing: Normal    Shoulder/Elbow ROM    Date: 17     Shoulder and Elbow ROM ( )   AROM in degrees AROM in degrees AROM in degrees    Right Left Right Left Right Left   Shoulder Flexion (0-180 ) WNL WNL, p at end rg       Shoulder Abduction (0-180 ) WNL WNL       Shoulder Extension (0-60 )         Shoulder ER (0-90 ) WNL WNL       Shoulder IR (0-70 )          Shoulder IR/Ext T8 Buttock, p       Elbow Flexion (150 )         Elbow Extension (0 )           Shoulder/Elbow Strength   Date: 6/27/17      Shoulder/Elbow Strength (/5)  Manual Muscle Test (MMT) MMT MMT MMT    Right Left Right Left Right Left   Shoulder Flexion 5 4+       Supraspinatus 5 4+       Shoulder Abduction 5 4+       Shoulder Extension 5 5       Shoulder External Rotation 5 4+       Shoulder Internal Rotation 5 5       Elbow Flexion 5 5       Elbow Extension 5 5       Other:         Other:           Palpation:   Increased tenderness over the left biceps tendon = minimal   Increased tenderness and tightness over left infraspinatus and supraspinatus = moderate   Increased tenderness in the left subscapularis (axilla)     Joint Assessment:  Sternoclavicular Joint Assessment: WNL  Acromioclavicular Joint Assessment: WNL  Scapulothoracic Joint Assessment: Hypomobile., decreased left lateral glide and overall movement   Glenohumeral Joint Assessment:WNL., Hypomobile.    Shoulder Special Tests     Impingement Cluster Right (+/-) Left (+/-) Rotator Cuff Tests Right (+/-) Left (+/-)   Chappell-Enrique - + Drop Arm Sign     Painful Arc - + Hornblowers     Infraspinatus Test - + ERLS - -   AC Tests Right (+/-) Left (+/-) IRLS - -   Active Compression - - Labral Tests Right (+/-) Left (+/-)   Crossbody Adduction - + Biceps Load Test II     AC Resisted Extension - - Jerk Test     GH Instability Tests Right (+/-) Left (+/-) Zohra Test     Sulcus Sign - - Biceps Tests Right (+/-) Left (+/-)   Apprehension - - Speed     Relocation   Danette horn     Other:   Other:     Other:   Other:         Treatment Today    TREATMENT MINUTES COMMENTS   Evaluation 20 Low    Self-care/ Home management     Manual therapy     Neuromuscular Re-education     Therapeutic Activity     Therapeutic Exercises 25 Pathology, POC, HEP, posture, etc   See flow sheet    Gait training     Modality__________________                Total 45    Blank areas  are intentional and mean the treatment did not include these items.     PT Evaluation Code: (Please list factors)  Patient History/Comorbidities: none  Examination: pain with ROM, weakness, tenderness   Clinical Presentation: stable and uncomplicated   Clinical Decision Making: low     Patient History/  Comorbidities Examination  (body structures and functions, activity limitations, and/or participation restrictions) Clinical Presentation Clinical Decision Making (Complexity)   No documented Comorbidities or personal factors 1-2 Elements Stable and/or uncomplicated Low   1-2 documented comorbidities or personal factor 3 Elements Evolving clinical presentation with changing characteristics Moderate   3-4 documented comorbidities or personal factors 4 or more Unstable and unpredictable High          Dariela Bustillo  6/27/2017  10:09 AM

## 2021-06-11 NOTE — PROGRESS NOTES
"[facility] Daily Progress     Patient Name: Lorena Sears  Date: 2017  Visit #: 2  Referring provider: Trisha Guerra MD  Visit Diagnosis:     ICD-10-CM    1. Acute pain of left shoulder M25.512    2. Shoulder impingement, left M75.42    3. Poor posture R29.3    4. Shoulder weakness M62.81    5. Decreased ROM of left shoulder M25.612          Assessment:     HEP/POC compliance is  good .  Patient demonstrates understanding/independence with home program.  Patient is benefitting from skilled physical therapy and is making steady progress toward functional goals.    Goal Status:  Pt. will demonstrate/verbalize independence in self-management of condition in : 6 weeks  Pt. will improve posture : and demonstrate posture with minimal to no cuing;in 6 weeks  Patient will decrease : SPADI score;for improved quality of function;in 6 weeks  Pt will: be able to reach behind her back and overhead with increased ease and pain <2/10; in 6 weeks     Plan / Patient Education:     Continue with initial plan of care.  Progress with home program as tolerated.    Subjective:     Pain Ratin -9 (high level of pain is with IR)    Pt reports that things are going well so far.  Pt reports minimal soreness.      Pt notes that she did her exercises and they went well.  Pt reports that she also iced and that seemed to help.      Objective:     Pt tolerates the exercises the exercises with moderate cueing.      Pt with minimally limited ROM and pain with end range flexion and IR.      Pt with decreased left shoulder strength and stability.      Pt with increased left UT and general shoulder tightness and tenderness.  This did decreased with MT.      Current Exercises:  Exercise #1: UBE   Comment #1: x3 min   Exercise #2: Gas pedal stretch   Comment #2: L 2x30\"  Exercise #3: Scap retract   Comment #3: x5 with 5\"  Exercise #4: shoulder IR with cane   Comment #4: Bx5   Exercise #5: Bent over Shoulder \"I\"   Comment #5: " Lx12      Treatment Today     TREATMENT MINUTES COMMENTS   Evaluation     Self-care/ Home management     Manual therapy 10 STM to left shoulder and UT and TPR into the left axilla (subscapularis) in supine   STM and TPR to left post shoulder muscles in S/L    Neuromuscular Re-education     Therapeutic Activity     Therapeutic Exercises 16 See flow sheet    Gait training     Modality__________________                Total 26    Blank areas are intentional and mean the treatment did not include these items.       Dariela Bustillo, PT   6/29/2017

## 2021-06-11 NOTE — PROGRESS NOTES
[facility] Daily Progress     Patient Name: Lorena Sears  Date: 2017  Visit #: 4  Referring provider: Trisha Guerra MD  Visit Diagnosis:     ICD-10-CM    1. Acute pain of left shoulder M25.512    2. Shoulder impingement, left M75.42    3. Poor posture R29.3    4. Shoulder weakness M62.81    5. Decreased ROM of left shoulder M25.612          Assessment:     HEP/POC compliance is  good .  Patient demonstrates understanding/independence with home program.  Patient is benefitting from skilled physical therapy and is making steady progress toward functional goals.    Goal Status:  Pt. will demonstrate/verbalize independence in self-management of condition in : 6 weeks;Progressing toward  Pt. will improve posture : and demonstrate posture with minimal to no cuing;in 6 weeks;Progressing toward  Patient will decrease : SPADI score;for improved quality of function;in 6 weeks;Progressing toward  Pt will: be able to reach behind her back and overhead with increased ease and pain <2/10; in 6 weeks ; Progressing toward    Plan / Patient Education:     Continue with initial plan of care.  Progress with home program as tolerated.     ADD ER/IR strength - bands?    Subjective:     Pain Ratin     Pt reports that her shoulder is actually feeling more sore the past few days.  Pt reports that she was less consistent with her exercises       Pt reports that she overdid it in the gym the other day and is feeling sore from that and then did not ice.      Pt notes that she did her exercises most days.      Objective:     Pt tolerates the exercises the exercises with moderate cueing.      Pt with minimally limited ROM and pain with end range flexion and IR.      Pt with decreased left shoulder strength and stability.      Pt with increased left UT and general shoulder tightness and tenderness.  This did decreased with MT.      Current Exercises:  Exercise #1: UBE   Comment #1: x3 min   Exercise #2: Gas pedal  "stretch   Comment #2: L x30\"  Exercise #3: Scap retract   Comment #3: x5 with 5\"  Exercise #4: shoulder IR with cane   Comment #4: Bx5   Exercise #5: Bent over Shoulder \"I\" and \"T\"  Comment #5: Lx20 I and Lx10 T - added to HEP   Exercise #6: shoulder band - extension and flexion   Comment #6: orange Bx10 each - added to HEP       Treatment Today     TREATMENT MINUTES COMMENTS   Evaluation     Self-care/ Home management     Manual therapy 14 STM to left shoulder and UT and TPR into the left axilla (subscapularis) in supine   STM and TPR to left post shoulder muscles in S/L    Neuromuscular Re-education     Therapeutic Activity     Therapeutic Exercises 3 UBE x3 min    Gait training     Modality: Electrical Stimulation  10  (13) Left shoulder IFC - high/sweep   Seated + set-up x3 min               Total 30    Blank areas are intentional and mean the treatment did not include these items.       Dariela Bustillo, PT   7/18/2017  "

## 2021-06-12 NOTE — PROGRESS NOTES
[facility] Daily Progress     Patient Name: Lorena Sears  Date: 8/3/2017  Visit #: 5  Referring provider: Trisha Guerra MD  Visit Diagnosis:     ICD-10-CM    1. Acute pain of left shoulder M25.512    2. Shoulder impingement, left M75.42    3. Poor posture R29.3    4. Shoulder weakness M62.81    5. Decreased ROM of left shoulder M25.612          Assessment:     HEP/POC compliance is  good .  Patient demonstrates understanding/independence with home program.  Patient is benefitting from skilled physical therapy and is making steady progress toward functional goals.    Goal Status:  Pt. will demonstrate/verbalize independence in self-management of condition in : 6 weeks;Progressing toward  Pt. will improve posture : and demonstrate posture with minimal to no cuing;in 6 weeks;Progressing toward  Patient will decrease : SPADI score;for improved quality of function;in 6 weeks;Progressing toward  Pt will: be able to reach behind her back and overhead with increased ease and pain <2/10; in 6 weeks ; Progressing toward    Plan / Patient Education:     Continue with initial plan of care.  Progress with home program as tolerated.     ADD ER/IR strength - bands?    Subjective:     Pain Ratin -8     Pt reports that her shoulder is slowly getting better with increased ROM and lessening pain.  Pt reports that primary pain occurs when reaching behind her back or pushing off to scoot herself over.      Pt reports that she was on vacation recently and she did not do her exercises while she was gone.     Pt reports that the estim seemed to help decrease her pain.      Objective:     Pt tolerates the exercises the exercises with moderate cueing.      Pt with minimally limited ROM and pain with end range flexion and IR.      Pt with decreased left shoulder strength and stability.  This is being addressed by her HEP and pt will increase her compliance with this now that she is home for vacation.     Pt with  "increased left UT and general shoulder tightness and tenderness.  This did decreased with MT.      Current Exercises:  Exercise #1: UBE   Comment #1: x3 min   Exercise #2: Gas pedal stretch   Comment #2: L x30\"  Exercise #3: Scap retract   Comment #3: x5 with 5\"  Exercise #4: shoulder IR with cane   Comment #4: Bx5   Exercise #5: Bent over Shoulder \"I\" and \"T\"  Comment #5: Lx20 I and Lx10 T - added to HEP   Exercise #6: shoulder band - extension and flexion   Comment #6: orange Bx10 each - added to HEP       Treatment Today     TREATMENT MINUTES COMMENTS   Evaluation     Self-care/ Home management     Manual therapy 14 STM/TPR  to left shoulder and UT  and TPR into the left axilla (subscapularis) in supine, GH post mobs + post mobs with IR   STM and TPR to left post shoulder muscles in S/L    Neuromuscular Re-education     Therapeutic Activity     Therapeutic Exercises 3 UBE x3 min    Gait training     Modality: Electrical Stimulation  10  (13) Left shoulder IFC - high/sweep   Seated + set-up x3 min               Total 30    Blank areas are intentional and mean the treatment did not include these items.       Dariela Bustillo, PT   8/3/2017  "

## 2021-06-12 NOTE — PROGRESS NOTES
[facility] Discharge Note    Patient Name: Lorena Sears  Date: 8/15/2017  Visit #: 7  Referring provider: Trisha Guerra MD  Visit Diagnosis:     ICD-10-CM    1. Acute pain of left shoulder M25.512    2. Shoulder impingement, left M75.42    3. Poor posture R29.3    4. Shoulder weakness M62.81    5. Decreased ROM of left shoulder M25.612          Assessment:     HEP/POC compliance is  good .  Patient demonstrates understanding/independence with home program.  Patient is ready or independent sx management and HEP.      Goal Status:  Pt. will demonstrate/verbalize independence in self-management of condition in : 6 weeks;Met  Pt. will improve posture : and demonstrate posture with minimal to no cuing;in 6 weeks;Met  Patient will decrease : SPADI score;for improved quality of function;in 6 weeks;Met  Pt will: be able to reach behind her back and overhead with increased ease and pain <2/10; in 6 weeks ; Met    Plan / Patient Education:     Patient discharged to independent sx management and HEP.      Subjective:     Pain Ratin    Pt reports that her left shoulder is continuing to feel better with increased ROM and lessening pain.  Pt reports that primary pain occurs when reaching behind her back or pushing off to scoot herself over and the intensity of this is significantly less.      Pt reports that she has increased her compliance on her HEP.  .     Pt reports that her shoulder is feeling up to 80% better and she feels ready to try independent sx management and HEP.  .      Objective:     Pt has progressed well with increased function, ROM, strength and function.      Shoulder/Elbow ROM    Date: 6/27/17 8/15/17     Shoulder and Elbow ROM ( ) AROM in degrees AROM in degrees    Right Left Right Left   Shoulder Flexion (0-180 ) WNL WNL, p at end rg  WNL  WNL, sore   Shoulder Abduction (0-180 ) WNL WNL  WNL WNL    Shoulder Extension (0-60 )           Shoulder ER (0-90 ) WNL WNL  WNL WNL    Shoulder  "IR (0-70 )           Shoulder IR/Ext T8 Buttock, p  T8 T10, sore    Elbow Flexion (150 )           Elbow Extension (0 )              Shoulder/Elbow Strength   Date: 6/27/17   8/15/17   Shoulder/Elbow Strength (/5)  Manual Muscle Test (MMT) MMT MMT    Right Left Right Left   Shoulder Flexion 5 4+  5  5    Supraspinatus 5 4+  5  5   Shoulder Abduction 5 4+  5  5   Shoulder Extension 5 5  5  5   Shoulder External Rotation 5 4+  5  5   Shoulder Internal Rotation 5 5  5  5   Elbow Flexion 5 5  5  5   Elbow Extension 5 5  5  5   Other:           Other:              Palpation:   Decreased tenderness over the left biceps tendon = none  Decreased tenderness and tightness over left infraspinatus and supraspinatus = minimal  Decreased tenderness in the left subscapularis (axilla) = none to minimal     Pt with increased left GH mobility.      Outcomes:  Shoulder Pain and Disability Index (SPADI) in %: 12  Eval score was 32%.       Current Exercises:  Exercise #1: UBE   Comment #1: x3 min   Exercise #2: Gas pedal stretch   Comment #2: L x30\"  Exercise #3: Scap retract   Comment #3: x5 with 5\"  Exercise #4: shoulder IR with cane   Comment #4: Bx5   Exercise #5: Bent over Shoulder \"I\" and \"T\"  Comment #5: Lx20 I and Lx10 T - added to HEP   Exercise #6: shoulder band - extension and flexion   Comment #6: orange Bx10 each - added to HEP       Treatment Today     TREATMENT MINUTES COMMENTS   Evaluation     Self-care/ Home management     Manual therapy 12 STM/TPR  to left shoulder and UT  and TPR into the left axilla (subscapularis) in supine, GH post mobs + post mobs with IR   STM and TPR to left post shoulder muscles in S/L    Neuromuscular Re-education     Therapeutic Activity     Therapeutic Exercises 5 Discussion of sx and POC  Reassessment of MMT, ROM, sx    Gait training     Modality: Electrical Stimulation  10  (13) Left shoulder IFC - high/sweep   Seated + set-up x3 min               Total 30    Blank areas are intentional " and mean the treatment did not include these items.       Dariela Bustillo, PT   8/15/2017

## 2021-06-12 NOTE — PROGRESS NOTES
[facility] Daily Progress     Patient Name: Lorena Sears  Date: 8/10/2017  Visit #: 6  Referring provider: Trisha Guerra MD  Visit Diagnosis:     ICD-10-CM    1. Acute pain of left shoulder M25.512    2. Shoulder impingement, left M75.42    3. Poor posture R29.3    4. Shoulder weakness M62.81    5. Decreased ROM of left shoulder M25.612          Assessment:     HEP/POC compliance is  good .  Patient demonstrates understanding/independence with home program.  Patient is benefitting from skilled physical therapy and is making steady progress toward functional goals.    Goal Status:  Pt. will demonstrate/verbalize independence in self-management of condition in : 6 weeks;Progressing toward  Pt. will improve posture : and demonstrate posture with minimal to no cuing;in 6 weeks;Progressing toward  Patient will decrease : SPADI score;for improved quality of function;in 6 weeks;Progressing toward  Pt will: be able to reach behind her back and overhead with increased ease and pain <2/10; in 6 weeks ; Progressing toward    Plan / Patient Education:     Continue with initial plan of care.  Progress with home program as tolerated.     ADD ER/IR strength - bands?    Subjective:     Pain Ratin    Pt reports that her shoulder is continuing to get better with increased ROM and lessening pain.  Pt reports that primary pain occurs when reaching behind her back or pushing off to scoot herself over.      Pt reports that she has increased her compliance on her HEP.  .     Pt reports that the estim seemed to help decrease her pain.      Objective:     Pt tolerates the exercises the exercises with moderate cueing.      Pt with minimally limited ROM and pain with end range flexion and IR.      Pt with decreased left shoulder strength and stability.  This is being addressed by her HEP and pt will increase her compliance with this now that she is home for vacation.     Pt with decreasing left UT and general shoulder  "tightness and tenderness.  This does decrease with MT.      Current Exercises:  Exercise #1: UBE   Comment #1: x3 min   Exercise #2: Gas pedal stretch   Comment #2: L x30\"  Exercise #3: Scap retract   Comment #3: x5 with 5\"  Exercise #4: shoulder IR with cane   Comment #4: Bx5   Exercise #5: Bent over Shoulder \"I\" and \"T\"  Comment #5: Lx20 I and Lx10 T - added to HEP   Exercise #6: shoulder band - extension and flexion   Comment #6: orange Bx10 each - added to HEP       Treatment Today     TREATMENT MINUTES COMMENTS   Evaluation     Self-care/ Home management     Manual therapy 12 STM/TPR  to left shoulder and UT  and TPR into the left axilla (subscapularis) in supine, GH post mobs + post mobs with IR   STM and TPR to left post shoulder muscles in S/L    Neuromuscular Re-education     Therapeutic Activity     Therapeutic Exercises 5 Discussion of sx and POC  Added shoulder band ER/IR to HEP    Gait training     Modality: Electrical Stimulation  10  (13) Left shoulder IFC - high/sweep   Seated + set-up x3 min               Total 30    Blank areas are intentional and mean the treatment did not include these items.       Dariela Bustillo, PT   8/10/2017  "

## 2021-06-13 NOTE — PROGRESS NOTES
[facility] Discharge Summary    Patient Name: Lorena Sears  Date: 2/2/2021  Referring provider: Brittani Arreaga MD  Visit Diagnosis:   1. Acute bilateral low back pain without sciatica     2. Generalized muscle weakness         Goals:  Pt. will demonstrate/verbalize independence in self-management of condition in : 6 weeks    Pt will: be able to do some light lifting and bending for work and home tasks with pain <2/10; in 6 weeks  Pt will: be able to perform housework tasks with improved core and body mechanics and pain <2/10; in 6 weeks      Patient was seen for the initial evaluation on 12/14/20.  She did not need to return for any follow-ups due to reduced pain.     Therapy will be discontinued at this time.  The patient will need a new referral to resume.    Thank you for your referral.  Dariela Bustillo  2/2/2021   9:43 AM      [facility]    [facility] Initial Evaluation    Patient Name: Lorena Sears  Date of evaluation: 12/14/2020  Referral Diagnosis: Low back pain, unspecified back pain laterality, unspecified chronicity, unspecified whether sciatica present  Referring provider: Brittani Arreaga MD  Visit Diagnosis:     ICD-10-CM    1. Acute bilateral low back pain without sciatica  M54.5    2. Generalized muscle weakness  M62.81        Assessment:       Pt presents to PT with an acute onset of low back pain on 10/29/20 after significant bending and lifting while at work.  The pt has slowly improved since the incident with reduced back pain and increased mobility.  The pt now has increased sore and stiffness with only heavier tasks.  Pt minimal loss in lumbar ROM with minimal pain.  The pt has decreased core, lumbar and hip strength.  The pt has mild tenderness over her lumbar muscles and has decreased spine mobility throughout.  The pt will benefit from a HEP to focus on mobility and strength in effort to prevent flare-ups in pain.     Pt. is appropriate for skilled PT intervention as outlined  in the Plan of Care (POC).    Goals:  Pt. will demonstrate/verbalize independence in self-management of condition in : 6 weeks    Pt will: be able to do some light lifting and bending for work and home tasks with pain <2/10; in 6 weeks  Pt will: be able to perform housework tasks with improved core and body mechanics and pain <2/10; in 6 weeks      Patient's expectations/goals are realistic.    Barriers to Learning or Achieving Goals:  No Barriers.       Plan / Patient Instructions:        Plan of Care:   Authorization / Certification Number of Visits: No visit limit  Communication with: Referral Source;Patient Caregiver;Employer  Patient Related Instruction: Nature of Condition;Treatment plan and rationale;Self Care instruction;Basis of treatment;Body mechanics;Posture  Times per Week: 2  Number of Weeks: 12  Number of Visits: 12 - PRN  Discharge Planning: Pt will be discharged upon meeting goals or plateau of progress  Therapeutic Exercise: ROM;Stretching;Strengthening  Neuromuscular Reeducation: kinesio tape;posture;balance/proprioception  Manual Therapy: soft tissue mobilization;myofascial release;joint mobilization;muscle energy;strain counterstrain  Modalities: electrical stimulation;TENS;cold pack;hot pack      Plan for next visit: Pt will continue with her HEP and will return to PT in the next 30 days if she has increased pain or would like exercise progressions.      Subjective:         History of Present Illness:    Lorena is a 50 y.o. female who presents to therapy today with complaints of low back pain. Date of onset/duration of symptoms is 10/29/20. Onset was sudden and after lifting, bending, etc. . Symptoms are getting better. She reports  an episodic  history of similar symptoms. She describes their previous level of function as not limited     Pt reports that she has recurring back issues.  However, in October 29th while she was at work and cleaning out equipment   Prednisone for 5 days was very  helpful.     Pt reports that now several week later she is feeling pretty good, but concerned about flare-ups.     She is also dealing with a L frozen shoulder and some R elbow pain.     Pain Ratin  Pain rating at best: 2  Pain rating at worst: 9  Pain description: aching, dull, pain and sharp    Functional limitations are described as occurring with:   bending  lifting  walking intensely   avoiding intense tasks     Patient reports benefit from:  heat, cold, prednisone          Objective:      Note: Items left blank indicates the item was not performed or not indicated at the time of the evaluation.    Patient Outcome Measures :    Modified Oswestry Low Back Pain Disablity Questionnaire  in %: 42     Scores range from 0-100%, where a score of 0% represents minimal pain and maximal function. The minimal clinically important difference is a score reduction of 12%.    Examination  1. Acute bilateral low back pain without sciatica     2. Generalized muscle weakness       Involved side: Bilateral  Posture Observation:      General sitting posture is  fair.    Lumbar ROM:    Date: 20     *Indicate scale AROM AROM AROM   Lumbar Flexion No loss, no pain      Lumbar Extension Min-mod loss, no pain       Right Left Right Left Right Left   Lumbar Sidebending Min loss, no pain  Min loss, pulling on R        Lumbar Rotation Min loss, no p Min loss, no p       Thoracic Flexion      Thoracic Extension      Thoracic Sidebending         Thoracic Rotation           Lower Extremity Strength:     Date: 20     LE strength/5 Right Left Right Left Right Left   Hip Flexion (L1-3) 4 4       Hip Extension (L5-S1) 4 4       Hip Abduction (L4-5) 4+ 4+       Hip Adduction (L2-3) 5 5       Hip External Rotation 5 5       Hip Internal Rotation 5 5       Knee Extension (L3-4) 5 5       Knee Flexion 5 5       Ankle Dorsiflexion (L4-5) 5 5       Great Toe Extension (L5)         Ankle Plantar flexion (S1)         Abdominals decreased   "     Sensation: WNL  Reflex Testing: NT     Palpation:  Increased tightness and tenderness over:   B lumbar muscles = minimal     Lumbar Special Tests:     Negative    Repeated Motion Testing:  Does not centralize  Does not peripheralize    Passive Mobility - Joint Integrity:  Hypomobile  Tender L4, 5, S1    LE Screen:  Not tested.    Treatment Today:  Exercises:  Exercise #1: Seated H/S stretch: Bx30\"  Comment #1: Quadruped cat/cow: x10  Exercise #2: Supine LTR: Bx10  Comment #2: Reach and roll (bow and arrow): R, L x5  Exercise #3: Ab set+ march:  Comment #3: Bx5 - cues for progression of leg extension  Exercise #4: Supine Bridge: Bx10  Comment #4: Standing Hip ABD: Bx10        TREATMENT MINUTES COMMENTS   Evaluation 30 Low complexity   Pathology, POC, HEP    Self-care/ Home management     Manual therapy     Neuromuscular Re-education     Therapeutic Activity     Therapeutic Exercises 30 See flow sheet or above    Gait training     Modality__________________                Total 60    Blank areas are intentional and mean the treatment did not include these items.     PT Evaluation Code: (Please list factors)  Patient History/Comorbidities:   There is no problem list on file for this patient.    Examination: min pain with ROM, weakness, tenderness, etc.   Clinical Presentation: stable and uncomplicated   Clinical Decision Making: low    Patient History/  Comorbidities Examination  (body structures and functions, activity limitations, and/or participation restrictions) Clinical Presentation Clinical Decision Making (Complexity)   No documented Comorbidities or personal factors 1-2 Elements Stable and/or uncomplicated Low   1-2 documented comorbidities or personal factor 3 Elements Evolving clinical presentation with changing characteristics Moderate   3-4 documented comorbidities or personal factors 4 or more Unstable and unpredictable High            Dariela Bustillo  12/14/2020  2:33 PM                 "

## 2021-10-23 ENCOUNTER — HEALTH MAINTENANCE LETTER (OUTPATIENT)
Age: 51
End: 2021-10-23

## 2022-02-12 ENCOUNTER — HEALTH MAINTENANCE LETTER (OUTPATIENT)
Age: 52
End: 2022-02-12

## 2022-04-09 ENCOUNTER — HEALTH MAINTENANCE LETTER (OUTPATIENT)
Age: 52
End: 2022-04-09

## 2022-10-09 ENCOUNTER — HEALTH MAINTENANCE LETTER (OUTPATIENT)
Age: 52
End: 2022-10-09

## 2023-03-25 ENCOUNTER — HEALTH MAINTENANCE LETTER (OUTPATIENT)
Age: 53
End: 2023-03-25

## 2023-05-21 ENCOUNTER — HEALTH MAINTENANCE LETTER (OUTPATIENT)
Age: 53
End: 2023-05-21